# Patient Record
Sex: FEMALE | Race: WHITE | NOT HISPANIC OR LATINO | Employment: OTHER | URBAN - METROPOLITAN AREA
[De-identification: names, ages, dates, MRNs, and addresses within clinical notes are randomized per-mention and may not be internally consistent; named-entity substitution may affect disease eponyms.]

---

## 2017-08-16 DIAGNOSIS — Z11.59 NEED FOR HEPATITIS C SCREENING TEST: Primary | ICD-10-CM

## 2017-08-18 ENCOUNTER — OFFICE VISIT (OUTPATIENT)
Dept: FAMILY MEDICINE | Facility: CLINIC | Age: 67
End: 2017-08-18
Payer: COMMERCIAL

## 2017-08-18 VITALS
SYSTOLIC BLOOD PRESSURE: 110 MMHG | HEIGHT: 66 IN | BODY MASS INDEX: 22.76 KG/M2 | DIASTOLIC BLOOD PRESSURE: 70 MMHG | WEIGHT: 141.63 LBS | HEART RATE: 83 BPM | OXYGEN SATURATION: 95 %

## 2017-08-18 DIAGNOSIS — Z13.220 LIPID SCREENING: ICD-10-CM

## 2017-08-18 DIAGNOSIS — Z00.00 ENCOUNTER FOR PREVENTIVE HEALTH EXAMINATION: Primary | ICD-10-CM

## 2017-08-18 DIAGNOSIS — Z12.39 BREAST CANCER SCREENING: ICD-10-CM

## 2017-08-18 DIAGNOSIS — Z12.2 ENCOUNTER FOR SCREENING FOR LUNG CANCER: ICD-10-CM

## 2017-08-18 DIAGNOSIS — G47.09 SLEEP INITIATION DISORDER: ICD-10-CM

## 2017-08-18 DIAGNOSIS — Z78.0 ASYMPTOMATIC POSTMENOPAUSAL STATE: ICD-10-CM

## 2017-08-18 DIAGNOSIS — Z11.59 NEED FOR HEPATITIS C SCREENING TEST: ICD-10-CM

## 2017-08-18 PROCEDURE — 90471 IMMUNIZATION ADMIN: CPT | Mod: S$GLB,,,

## 2017-08-18 PROCEDURE — 90670 PCV13 VACCINE IM: CPT | Mod: S$GLB,,,

## 2017-08-18 PROCEDURE — 99397 PER PM REEVAL EST PAT 65+ YR: CPT | Mod: 25,S$GLB,,

## 2017-08-18 PROCEDURE — 99999 PR PBB SHADOW E&M-EST. PATIENT-LVL IV: CPT | Mod: PBBFAC,,,

## 2017-08-18 NOTE — PROGRESS NOTES
Subjective:       Patient ID: Belen Salazar is a 67 y.o. female.    Chief Complaint: Annual Exam  Has trouble sleeping at night. Has difficulty falling asleep, usually gets into bed around 10, reads in bed, and cannot fall asleep until 2. If she does fall asleep earlier, she will wake up around 2. Snores at night and was previously told by  that she had periods where she stopped breathing. Sleep study was ordered but was not covered by insurance.  HPI     18 - Recommended (A, B)    Grade Title Risk Info. Details   Had screening 4-5 years ago which was normal Colorectal Cancer: Screening --Adults aged 50 to 75 years     Low risk High Blood Pressure: Screening and Home Monitoring -- Adults     Quit Tobacco Smoking Cessation: Behavioral and Pharmacotherapy Interventions -- Adults who are not pregnant     Drinks 2 liquor drinks per night Alcohol Misuse: Screening and Behavioral Counseling Interventions in Primary Care -- Adults     Low risk BRCA-Related Cancer: Risk Assessment, Genetic Counseling, & Genetic Testing -- Women at Increased Risk     Low risk Breast Cancer: Preventive Medications -- Women At Increased Risk     Last mammogram was 3 years ago, will schedule Breast Cancer: Screening with Mammography-- Women aged 50 to 74 years     Has prescriptions that she hasn't filled in awhile, willing to start new medication. One was expensive Depression: Screening -- General adult population, including pregnant and postpartum women     Will check today Diabetes Mellitus (Type 2) andAbnormal Blood Glucose: Screening -- Adults aged 40 to 70 years who are overweight or obese     Low risk Fall Prevention --Exercise/Physical Therapy ANDVitamin D Supplementation: Community-dwelling Adults 65 Years or Older, Increased Risk for Falls     Eats in moderation, planning on walking and exercising more post-custodial Healthful Diet and Physical Activity for CVD Disease Prevention: Counseling -- Adults with CVD Risk Factors      Low risk Hepatitis B: Screening -- Nonpregnant Adolescents and Adults At High Risk     Will screen today Hepatitis C Virus Infection: Screening--Adults at High Risk and Adults born between 1945 and 1965     Low risk Latent Tuberculosis Infection: Screening -- Asymptomatic adults at increased risk for infection     Candidate Lung Cancer: Screening -- Adults Ages 55-80 who have a 30 pack-year smoking history and currently smoke or have quit within the past 15 years     Low risk Obesity: Screening for and Management of-- All Adults       Has had a DEXA scan which showed osteoporosis Osteoporosis: Screening -- Women 65+ and Younger Women at Increased Risk     Will screen Statin Use for the Primary Prevention of CVD: Preventive Medicine -- Adults age 40 to 75 years with no history of CVD, 1 or more CVD risk factors, and a calculated 10-year CVD event risk of 10% or greater.       The [place] Sleepiness Scale  The [place] Sleepiness Scale is widely used in the field of sleep medicine as a subjective measure of a  patient's sleepiness. The test is a list of eight situations in which you rate your tendency to become  sleepy on a scale of 0, no chance of dozing, to 3, high chance of dozing. When you finish the test, add  up the values of your responses. Your total score is based on a scale of 0 to 24. The scale estimates  whether you are experiencing excessive sleepiness that possibly requires medical attention.  How Sleepy Are You?  How likely are you to doze off or fall asleep in the following situations? You should rate your chances  of dozing off, not just feeling tired. Even if you have not done some of these things recently try to  determine how they would have affected you. For each situation, decide whether or not you would  have:  ·  No chance of dozing =0  ·  Slight chance of dozing =1  ·  Moderate chance of dozing =2  ·  High chance of dozing =3  Write down the number corresponding to your choice in the right  hand column. Total your score below.  Situation Chance of Dozing  Sitting and reading · 1  Watching TV · 2  Sitting inactive in a public place (e.g., a theater or  a meeting) · 1  As a passenger in a car for an hour without a  Break · 1  Lying down to rest in the afternoon when  circumstances permit · 1  Sitting and talking to someone · 0  Sitting quietly after a lunch without alcohol · 0  In a car, while stopped for a few minutes in traffic · 0  Total Score = ____6____________________  Analyze Your Score  Interpretation:  0-7:It is unlikely that you are abnormally sleepy.  8-9:You have an average amount of daytime sleepiness.  10-15:You may be excessively sleepy depending on the situation. You may want to consider  seeking medical attention.  16-24:You are excessively sleepy and should consider seeking medical attention.  Reference: Loly AMEZQUITA. A new method for measuring daytime sleepiness: The [place] Sleepiness Scale. Sleep  1991; 14(6):540-5.  This printed version of the [place] Sleepiness Scale is provided courtesy of [employer], [employer].  www.[employer].  Review of Systems   Constitutional: Positive for fatigue. Negative for appetite change, chills and fever.   HENT: Positive for hearing loss. Negative for sinus pressure, sore throat and trouble swallowing.         Has not noticed drastic hearing loss, but has noticed that she has to turn the TV louder now.    Snores    Eyes: Negative for visual disturbance.   Respiratory: Positive for cough and shortness of breath. Negative for wheezing.         Has some shortness of breath with exertion   Cardiovascular: Negative for chest pain, palpitations and leg swelling.   Gastrointestinal: Negative for blood in stool, constipation, diarrhea, nausea and vomiting.   Genitourinary: Negative for difficulty urinating, hematuria and urgency.   Musculoskeletal: Negative for arthralgias and myalgias.   Allergic/Immunologic: Negative.    Neurological: Negative for  dizziness, light-headedness and headaches.         Cage screening negative     Objective:      Vitals:    08/18/17 0703   BP: 110/70   Pulse: 83     Physical Exam   Constitutional: She is oriented to person, place, and time. She appears well-developed and well-nourished.   HENT:   Mouth/Throat: Oropharynx is clear and moist.   Cardiovascular: Normal rate and regular rhythm.    No murmur heard.  Pulmonary/Chest: Effort normal and breath sounds normal. She has no wheezes.   Abdominal: Soft. Bowel sounds are normal. There is no tenderness.   Musculoskeletal: Normal range of motion.   Lymphadenopathy:     She has no cervical adenopathy.   Neurological: She is alert and oriented to person, place, and time.   Skin: Skin is warm and dry.   Vitals reviewed.      Assessment:       1. Encounter for preventive health examination    2. Breast cancer screening    3. Asymptomatic postmenopausal state    4. Lipid screening    5. Sleep initiation disorder    6. Need for hepatitis C screening test    7. Encounter for screening for lung cancer        Plan:       Encounter for preventive health examination    Breast cancer screening  -     Mammo Digital Screening Bilateral With CAD; Future; Expected date: 08/18/2017    Asymptomatic postmenopausal state  -     DXA Bone Density Spine And Hip; Future; Expected date: 08/18/2017    Lipid screening  -     CBC auto differential; Future; Expected date: 08/18/2017  -     Comprehensive metabolic panel; Future; Expected date: 08/18/2017  -     Lipid panel; Future; Expected date: 08/18/2017    Sleep initiation disorder  -     TSH; Future; Expected date: 08/18/2017    Need for hepatitis C screening test  -     Hepatitis C antibody; Future; Expected date: 08/18/2017    Encounter for screening for lung cancer  -     CT Chest Lung Screening Low Dose; Future; Expected date: 08/18/2017    Other orders  -     (In Office Administered) Pneumococcal Conjugate Vaccine (13 Valent) (IM)      Return in about 1  year (around 8/18/2018).        Sleep hygiene tips:  Maintain a regular sleep routine  Go to bed at the same time. Wake up at the same time. Ideally, your schedule will remain the same (+/- 20 minutes) every night of the week.  Avoid naps if possible  Naps decrease the Sleep Debt that is so necessary for easy sleep onset.   Each of us needs a certain amount of sleep per 24-hour period. We need that amount, and we dont need more than that.   When we take naps, it decreases the amount of sleep that we need the next night - which may cause sleep fragmentation and difficulty initiating sleep, and may lead to insomnia.  Dont stay in bed awake for more than 5-10 minutes.  If you find your mind racing, or worrying about not being able to sleep during the middle of the night, get out of bed, and sit in a chair in the dark. Do your mind racing in the chair until you are sleepy, then return to bed. No TV or internet during these periods! That will just stimulate you more than desired.  If this happens several times during the night, that is OK. Just maintain your regular wake time, and try to avoid naps.  Dont watch TV or read in bed.  When you watch TV or read in bed, you associate the bed with wakefulness.   The bed is reserved for two things - sleep and hanky panky.  Drink caffeinated drinks with caution  The effects of caffeine may last for several hours after ingestion. Caffeine can fragment sleep, and cause difficulty initiating sleep. If you drink caffeine, use it only before noon.   Remember that soda and tea contain caffeine as well.     Avoid inappropriate substances that interfere with sleep  Cigarettes, alcohol, and over-the-counter medications may cause fragmented sleep.  Exercise regularly  Exercise before 2 pm every day. Exercise promotes continuous sleep.   Avoid rigorous exercise before bedtime. Rigorous exercise circulates endorphins into the body which may cause difficulty initiating sleep.   exercise  and sleep  Have a quiet, comfortable bedroom  Set your bedroom thermostat at a comfortable temperature. Generally, a little cooler is better than a little warmer.   Turn off the TV and other extraneous noise that may disrupt sleep. Background white noise like a fan is OK.   If your pets awaken you, keep them outside the bedroom.   Your bedroom should be dark. Turn off bright lights.   Have a comfortable mattress.  If you are a clock watcher at night, hide the clock.    Have a comfortable pre-bedtime routine  A warm bath, shower   Meditation, or quiet time  Some who are struggling with sleep regularly find it helpful to print out these recommendations and read them regularly. If you accidentally miss some of recommendations, or have a bad night, do not fret. By following these sleep hygiene recommendations, you will help yourself to get into a routine that promotes good sleep opportunities.

## 2017-08-21 ENCOUNTER — TELEPHONE (OUTPATIENT)
Dept: FAMILY MEDICINE | Facility: CLINIC | Age: 67
End: 2017-08-21

## 2017-08-21 RX ORDER — ALENDRONATE SODIUM 70 MG/1
70 TABLET ORAL
Qty: 12 TABLET | Refills: 3 | Status: SHIPPED | OUTPATIENT
Start: 2017-08-21 | End: 2018-07-18 | Stop reason: SDUPTHER

## 2017-08-22 DIAGNOSIS — Z12.11 COLON CANCER SCREENING: ICD-10-CM

## 2018-02-01 ENCOUNTER — OFFICE VISIT (OUTPATIENT)
Dept: FAMILY MEDICINE | Facility: CLINIC | Age: 68
End: 2018-02-01
Payer: MEDICARE

## 2018-02-01 VITALS
HEIGHT: 66 IN | OXYGEN SATURATION: 99 % | BODY MASS INDEX: 23.3 KG/M2 | SYSTOLIC BLOOD PRESSURE: 116 MMHG | DIASTOLIC BLOOD PRESSURE: 78 MMHG | HEART RATE: 74 BPM | WEIGHT: 145 LBS

## 2018-02-01 DIAGNOSIS — R51.9 OCCIPITAL HEADACHE: Primary | ICD-10-CM

## 2018-02-01 PROCEDURE — 3008F BODY MASS INDEX DOCD: CPT | Mod: S$GLB,,,

## 2018-02-01 PROCEDURE — 1159F MED LIST DOCD IN RCRD: CPT | Mod: S$GLB,,,

## 2018-02-01 PROCEDURE — 99999 PR PBB SHADOW E&M-EST. PATIENT-LVL III: CPT | Mod: PBBFAC,,,

## 2018-02-01 PROCEDURE — 99214 OFFICE O/P EST MOD 30 MIN: CPT | Mod: S$GLB,,,

## 2018-02-01 PROCEDURE — 1125F AMNT PAIN NOTED PAIN PRSNT: CPT | Mod: S$GLB,,,

## 2018-02-01 NOTE — PROGRESS NOTES
Subjective:       Patient ID: Belen Salazar is a 67 y.o. female.    Chief Complaint: Headache    HPI The patient is having sudden sharp stabbing pain on the left side of her head starting less than a week ago. Starting while she was laying down. She is not sleeping very good. She has early morning arousals. No trauma to her head.     Review of Systems   Constitutional: Negative.  Negative for activity change, appetite change, chills, diaphoresis, fatigue, fever and unexpected weight change.   HENT: Negative.  Negative for congestion, dental problem, sinus pain, trouble swallowing and voice change.    Eyes: Positive for visual disturbance.        Blurry vision coincides with onset of head pain    Respiratory: Negative.  Negative for shortness of breath.    Cardiovascular: Negative.  Negative for chest pain.   Gastrointestinal: Negative.    Endocrine: Negative.    Genitourinary: Negative.    Musculoskeletal: Negative.  Negative for arthralgias, myalgias, neck pain and neck stiffness.   Skin: Negative.    Allergic/Immunologic: Negative.    Neurological: Positive for headaches. Negative for dizziness, speech difficulty, weakness, light-headedness and numbness.   Hematological: Negative.  Negative for adenopathy. Does not bruise/bleed easily.   Psychiatric/Behavioral: Positive for sleep disturbance.   All other systems reviewed and are negative.      Objective:      Vitals:    02/01/18 1124   BP: 116/78   Pulse: 74     Physical Exam   Constitutional: She is oriented to person, place, and time. She appears well-developed and well-nourished. She is active.  Non-toxic appearance. She does not have a sickly appearance. She does not appear ill. No distress.   HENT:   Head: Normocephalic and atraumatic.   Right Ear: External ear normal.   Left Ear: External ear normal.   Nose: Nose normal.   Hearing normal.    Eyes: Conjunctivae are normal. Pupils are equal, round, and reactive to light.   Normal fundoscopic exam    Neck:  Normal range of motion. Neck supple. No thyroid mass and no thyromegaly present.   Cardiovascular: Normal rate, regular rhythm, normal heart sounds and intact distal pulses.  Exam reveals no gallop and no friction rub.    No murmur heard.  Pulses:       Dorsalis pedis pulses are 2+ on the right side, and 2+ on the left side.        Posterior tibial pulses are 2+ on the right side, and 2+ on the left side.   Pulmonary/Chest: Effort normal and breath sounds normal. No respiratory distress. She exhibits no tenderness.   Musculoskeletal: Normal range of motion. She exhibits no edema.   Lymphadenopathy:     She has no cervical adenopathy.   Neurological: She is alert and oriented to person, place, and time. She has normal strength. No cranial nerve deficit. Coordination and gait normal.   Skin: Skin is warm and dry. She is not diaphoretic. No pallor.   Psychiatric: She has a normal mood and affect. Her speech is normal and behavior is normal. Judgment and thought content normal. Cognition and memory are normal.   Vitals reviewed.      Assessment:       1. Occipital headache        Plan:       Occipital headache  -     Ambulatory referral to Pain Clinic  -     CBC auto differential; Future; Expected date: 02/01/2018  -     Sedimentation rate, manual; Future; Expected date: 02/01/2018    Other orders  -     zoster vaccine live, PF, (ZOSTAVAX, PF,) 19,400 unit/0.65 mL injection; Inject 19,400 Units into the skin once.  Dispense: 1 vial; Refill: 0  -     diphth,pertus,acell,,tetanus (BOOSTRIX) 2.5-8-5 Lf-mcg-Lf/0.5mL Susp; Inject 0.5 mLs into the muscle once.  Dispense: 0.5 mL; Refill: 0      Follow-up in about 3 months (around 5/1/2018).

## 2018-07-18 ENCOUNTER — OFFICE VISIT (OUTPATIENT)
Dept: INTERNAL MEDICINE | Facility: CLINIC | Age: 68
End: 2018-07-18
Payer: MEDICARE

## 2018-07-18 VITALS
SYSTOLIC BLOOD PRESSURE: 118 MMHG | RESPIRATION RATE: 16 BRPM | BODY MASS INDEX: 22.77 KG/M2 | DIASTOLIC BLOOD PRESSURE: 60 MMHG | TEMPERATURE: 98 F | OXYGEN SATURATION: 98 % | WEIGHT: 141.69 LBS | HEIGHT: 66 IN | HEART RATE: 79 BPM

## 2018-07-18 DIAGNOSIS — M81.0 OSTEOPOROSIS, UNSPECIFIED OSTEOPOROSIS TYPE, UNSPECIFIED PATHOLOGICAL FRACTURE PRESENCE: Primary | ICD-10-CM

## 2018-07-18 DIAGNOSIS — Z13.6 SCREENING FOR CARDIOVASCULAR CONDITION: ICD-10-CM

## 2018-07-18 PROCEDURE — 99999 PR PBB SHADOW E&M-EST. PATIENT-LVL III: CPT | Mod: PBBFAC,,, | Performed by: INTERNAL MEDICINE

## 2018-07-18 PROCEDURE — 99213 OFFICE O/P EST LOW 20 MIN: CPT | Mod: S$GLB,,, | Performed by: INTERNAL MEDICINE

## 2018-07-18 RX ORDER — CYCLOSPORINE 0.5 MG/ML
1 EMULSION OPHTHALMIC 2 TIMES DAILY
COMMUNITY
Start: 2018-07-12

## 2018-07-18 RX ORDER — ALENDRONATE SODIUM 70 MG/1
70 TABLET ORAL
Qty: 12 TABLET | Refills: 3 | Status: SHIPPED | OUTPATIENT
Start: 2018-07-18 | End: 2018-08-06 | Stop reason: SDUPTHER

## 2018-07-18 NOTE — PROGRESS NOTES
"Subjective:      Patient ID: Belen Salazar is a 68 y.o. female.    Chief Complaint: Establish Care and Medication Refill    HPI: 68 y.o. White female, was a pt of Dr. Granda.  New to me.  I have reviewed her chart,PMH,SH,.  Only complaint: 2 lesions on right cheek.    Doing well.  UTD with procedures.  Exercises 4xweek.  Former smoker, quit decades ago. Social ETOH.  NKA.         Review of Systems   Constitutional: Negative.    HENT: Negative.    Eyes: Positive for visual disturbance.        Dry eyes   Respiratory: Negative.    Cardiovascular: Negative.    Gastrointestinal: Negative.    Endocrine: Negative.    Genitourinary: Negative.    Musculoskeletal: Negative.    Skin: Negative.    Allergic/Immunologic: Negative.    Neurological: Negative.    Hematological: Negative.    Psychiatric/Behavioral: Negative.        Objective:   /60 (BP Location: Left arm, Patient Position: Sitting, BP Method: Medium (Manual))   Pulse 79   Temp 98.3 °F (36.8 °C) (Oral)   Resp 16   Ht 5' 5.5" (1.664 m)   Wt 64.3 kg (141 lb 11.2 oz)   SpO2 98%   BMI 23.22 kg/m²     Physical Exam   Constitutional: She is oriented to person, place, and time. She appears well-developed and well-nourished.   HENT:   Head: Normocephalic and atraumatic.   Left Ear: External ear normal.   Nose: Nose normal.   Mouth/Throat: Oropharynx is clear and moist.   Eyes: Conjunctivae and EOM are normal. Pupils are equal, round, and reactive to light.   Neck: Normal range of motion. Neck supple.   Cardiovascular: Normal rate, regular rhythm and normal heart sounds.    Pulmonary/Chest: Effort normal and breath sounds normal.   Abdominal: Soft. Bowel sounds are normal.   Musculoskeletal: Normal range of motion.   Neurological: She is alert and oriented to person, place, and time.   Skin: Skin is warm and dry.   Skin tag  Sebaceous cyst  Both on right cheek.   Psychiatric: She has a normal mood and affect. Her behavior is normal. Judgment and thought " content normal.   Nursing note and vitals reviewed.      Assessment:     1. Osteoporosis, unspecified osteoporosis type, unspecified pathological fracture presence    2. Screening for cardiovascular condition      Plan:     Osteoporosis, unspecified osteoporosis type, unspecified pathological fracture presence  -     Comprehensive metabolic panel; Future; Expected date: 07/18/2018  -     CBC auto differential; Future; Expected date: 07/18/2018  -     Urinalysis; Future; Expected date: 07/18/2018  -     Vitamin D; Future; Expected date: 07/18/2018  -     alendronate (FOSAMAX) 70 MG tablet; Take 1 tablet (70 mg total) by mouth every 7 days.  Dispense: 12 tablet; Refill: 3    Screening for cardiovascular condition  -     Lipid panel; Future; Expected date: 07/18/2018

## 2018-08-06 ENCOUNTER — PATIENT MESSAGE (OUTPATIENT)
Dept: INTERNAL MEDICINE | Facility: CLINIC | Age: 68
End: 2018-08-06

## 2018-08-06 DIAGNOSIS — M81.0 OSTEOPOROSIS, UNSPECIFIED OSTEOPOROSIS TYPE, UNSPECIFIED PATHOLOGICAL FRACTURE PRESENCE: ICD-10-CM

## 2018-08-06 RX ORDER — ALENDRONATE SODIUM 70 MG/1
70 TABLET ORAL
Qty: 12 TABLET | Refills: 3 | Status: SHIPPED | OUTPATIENT
Start: 2018-08-06 | End: 2019-08-06

## 2019-05-14 LAB
LEFT EYE DM RETINOPATHY: NORMAL
RIGHT EYE DM RETINOPATHY: NORMAL

## 2019-05-27 ENCOUNTER — TELEPHONE (OUTPATIENT)
Dept: ADMINISTRATIVE | Facility: HOSPITAL | Age: 69
End: 2019-05-27

## 2019-07-24 ENCOUNTER — OFFICE VISIT (OUTPATIENT)
Dept: INTERNAL MEDICINE | Facility: CLINIC | Age: 69
End: 2019-07-24
Payer: MEDICARE

## 2019-07-24 VITALS
OXYGEN SATURATION: 97 % | HEART RATE: 67 BPM | DIASTOLIC BLOOD PRESSURE: 74 MMHG | WEIGHT: 144.63 LBS | TEMPERATURE: 98 F | HEIGHT: 66 IN | BODY MASS INDEX: 23.24 KG/M2 | SYSTOLIC BLOOD PRESSURE: 106 MMHG

## 2019-07-24 DIAGNOSIS — Z78.9 PARTICIPANT IN HEALTH AND WELLNESS PLAN: Primary | ICD-10-CM

## 2019-07-24 DIAGNOSIS — Z23 NEED FOR PNEUMOCOCCAL VACCINATION: ICD-10-CM

## 2019-07-24 DIAGNOSIS — R07.89 CHEST HEAVINESS: ICD-10-CM

## 2019-07-24 DIAGNOSIS — L91.8 SKIN TAGS, MULTIPLE ACQUIRED: ICD-10-CM

## 2019-07-24 DIAGNOSIS — Z12.31 ENCOUNTER FOR SCREENING MAMMOGRAM FOR BREAST CANCER: ICD-10-CM

## 2019-07-24 PROCEDURE — 99999 PR PBB SHADOW E&M-EST. PATIENT-LVL IV: ICD-10-PCS | Mod: PBBFAC,,, | Performed by: INTERNAL MEDICINE

## 2019-07-24 PROCEDURE — 99999 PR PBB SHADOW E&M-EST. PATIENT-LVL IV: CPT | Mod: PBBFAC,,, | Performed by: INTERNAL MEDICINE

## 2019-07-24 PROCEDURE — 99397 PR PREVENTIVE VISIT,EST,65 & OVER: ICD-10-PCS | Mod: 25,S$GLB,, | Performed by: INTERNAL MEDICINE

## 2019-07-24 PROCEDURE — 99397 PER PM REEVAL EST PAT 65+ YR: CPT | Mod: 25,S$GLB,, | Performed by: INTERNAL MEDICINE

## 2019-07-24 PROCEDURE — 93005 EKG 12-LEAD: ICD-10-PCS | Mod: S$GLB,,, | Performed by: INTERNAL MEDICINE

## 2019-07-24 PROCEDURE — 93005 ELECTROCARDIOGRAM TRACING: CPT | Mod: S$GLB,,, | Performed by: INTERNAL MEDICINE

## 2019-07-24 PROCEDURE — 93010 EKG 12-LEAD: ICD-10-PCS | Mod: S$GLB,,, | Performed by: INTERNAL MEDICINE

## 2019-07-24 PROCEDURE — G0009 ADMIN PNEUMOCOCCAL VACCINE: HCPCS | Mod: S$GLB,,, | Performed by: INTERNAL MEDICINE

## 2019-07-24 PROCEDURE — 93010 ELECTROCARDIOGRAM REPORT: CPT | Mod: S$GLB,,, | Performed by: INTERNAL MEDICINE

## 2019-07-24 PROCEDURE — G0009 PNEUMOCOCCAL POLYSACCHARIDE VACCINE 23-VALENT =>2YO SQ IM: ICD-10-PCS | Mod: S$GLB,,, | Performed by: INTERNAL MEDICINE

## 2019-07-24 PROCEDURE — 90732 PPSV23 VACC 2 YRS+ SUBQ/IM: CPT | Mod: S$GLB,,, | Performed by: INTERNAL MEDICINE

## 2019-07-24 PROCEDURE — 90732 PNEUMOCOCCAL POLYSACCHARIDE VACCINE 23-VALENT =>2YO SQ IM: ICD-10-PCS | Mod: S$GLB,,, | Performed by: INTERNAL MEDICINE

## 2019-07-24 NOTE — PROGRESS NOTES
INTERNAL MEDICINE    Patient Active Problem List   Diagnosis    Depression    Occipital headache       CC:   Chief Complaint   Patient presents with    Annual Exam       SUBJECTIVE:  Belen Salazar   is a 69 y.o. female  HPI   69y/oWF presents for her annual exam.  She has had some skin tags that get caught, and that are bothering her.  However, more importantly, she has had on occasion a chest heaviness, unassociated with: diaphoresis,SOB,ALLISON,PND,orthopnea,exertion,radiation,reflux symptoms, dizziness,weakness,light headedness or impending syncope.  Former smoker.  Still grieving the loss of her  .    ROS: Review of Systems   Constitutional: Negative.    HENT: Negative.    Eyes: Negative.    Respiratory: Positive for chest tightness. Negative for cough, choking, shortness of breath and wheezing.    Cardiovascular: Negative.  Negative for chest pain, palpitations and leg swelling.   Gastrointestinal: Negative.    Endocrine: Negative.    Genitourinary: Negative.    Musculoskeletal: Negative.    Skin: Negative.    Neurological: Negative.    Hematological: Negative.    Psychiatric/Behavioral: Positive for dysphoric mood.       Past Medical History:   Diagnosis Date    Cataract     bilateral, removed 2012    Depression 3/6/2015    Hypotension, unspecified     Miscarriage     3 in the early 1980s    Osteoporosis     diagnosed 2006       Past Surgical History:   Procedure Laterality Date    DILATION AND CURETTAGE OF UTERUS      1 or 2 following miscarriage(s)    EYE SURGERY      cataract removal 2012    TONSILLECTOMY         Family History   Problem Relation Age of Onset    COPD Father     Cancer Maternal Uncle     Cancer Maternal Grandmother     Heart disease Maternal Grandfather     Diabetes Paternal Grandmother     Hypertension Paternal Grandmother     Heart disease Paternal Grandfather     Stroke Paternal Grandfather        Social History     Tobacco Use    Smoking status: Former Smoker  "    Packs/day: 0.75     Years: 30.00     Pack years: 22.50     Types: Cigarettes    Smokeless tobacco: Never Used    Tobacco comment: Has quit previously, not currently considering quitting. Last quit 2 years ago.   Substance Use Topics    Alcohol use: Yes     Alcohol/week: 8.8 oz     Types: 3 Cans of beer, 14 Standard drinks or equivalent per week    Drug use: No       Social History     Social History Narrative    Lives in [place] with her adopted daughter and grandson. 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 She is an ex- cigarette smoker.   Parkinsons disease in 2017.        ALLERGIES: Review of patient's allergies indicates:  No Known Allergies    MEDS:   Current Outpatient Medications:     alendronate (FOSAMAX) 70 MG tablet, Take 1 tablet (70 mg total) by mouth every 7 days., Disp: 12 tablet, Rfl: 3    RESTASIS 0.05 % ophthalmic emulsion, Place 1 drop into both eyes 2 (two) times daily., Disp: , Rfl:     OBJECTIVE:   Vitals:    19 0842   BP: 106/74   BP Location: Left arm   Patient Position: Sitting   BP Method: Medium (Manual)   Pulse: 67   Temp: 97.7 °F (36.5 °C)   TempSrc: Oral   SpO2: 97%   Weight: 65.6 kg (144 lb 9.6 oz)   Height: 5' 5.5" (1.664 m)     Body mass index is 23.7 kg/m².    Physical Exam   Constitutional: She is oriented to person, place, and time. She appears well-developed and well-nourished.   HENT:   Head: Normocephalic and atraumatic.   Right Ear: External ear normal.   Left Ear: External ear normal.   Nose: Nose normal.   Mouth/Throat: Oropharynx is clear and moist.   Eyes: Pupils are equal, round, and reactive to light. Conjunctivae and EOM are normal.   Neck: Normal range of motion. Neck supple.   Cardiovascular: Normal rate, regular rhythm and normal heart sounds.   Pulmonary/Chest: Effort normal and breath sounds normal.   Abdominal: Soft. Bowel sounds are normal.   Musculoskeletal: Normal range of motion.   Neurological: She " is alert and oriented to person, place, and time.   Skin: Skin is warm and dry.   Psychiatric: She has a normal mood and affect. Her behavior is normal. Judgment and thought content normal.   Nursing note and vitals reviewed.        PERTINENT RESULTS:   CBC:  Recent Labs   Lab Result Units 07/24/19  1038   WBC K/uL 6.37   RBC M/uL 4.70   Hemoglobin g/dL 15.0   Hematocrit % 44.4   Platelets K/uL 300   Mean Corpuscular Volume fL 95   Mean Corpuscular Hemoglobin pg 31.9*   Mean Corpuscular Hemoglobin Conc g/dL 33.8     CMP:  Recent Labs   Lab Result Units 07/24/19  1038   Glucose mg/dL 95   Calcium mg/dL 9.3   Albumin g/dL 4.3   Total Protein g/dL 7.3   Sodium mmol/L 140   Potassium mmol/L 4.2   CO2 mmol/L 28   Chloride mmol/L 104   BUN, Bld mg/dL 13   Alkaline Phosphatase U/L 68   ALT U/L 13   AST U/L 27   Total Bilirubin mg/dL 1.1*     URINALYSIS:  Recent Labs   Lab Result Units 07/24/19  1039   Color, UA  Yellow   Specific Gravity, UA  >=1.030*   pH, UA  6.0   Protein, UA  Negative   Bacteria /hpf Occasional   Nitrite, UA  Negative   Leukocytes, UA  1+*   Urobilinogen, UA EU/dL Negative      LIPIDS:  Recent Labs   Lab Result Units 07/24/19  1038   HDL mg/dL 101*   Cholesterol mg/dL 232*   Triglycerides mg/dL 84   LDL Cholesterol mg/dL 114.2   Hdl/Cholesterol Ratio % 43.5   Non-HDL Cholesterol mg/dL 131   Total Cholesterol/HDL Ratio  2.3             ASSESSMENT:  Problem List Items Addressed This Visit     None      Visit Diagnoses     Participant in health and wellness plan    -  Primary    Relevant Orders    CBC auto differential (Completed)    Comprehensive metabolic panel (Completed)    Urinalysis (Completed)    Lipid panel (Completed)    Encounter for screening mammogram for breast cancer        Relevant Orders    Mammo Digital Screening Bilat w/ Wicho    Chest heaviness        Relevant Orders    EKG 12-lead (Completed)    Need for pneumococcal vaccination        Relevant Orders    Pneumococcal Polysaccharide  Vaccine (23 Valent) (SQ/IM) (Completed)    Skin tags, multiple acquired        Relevant Orders    Ambulatory referral to Dermatology          PLAN:   Orders Placed This Encounter    Mammo Digital Screening Bilat w/ Wicho    Pneumococcal Polysaccharide Vaccine (23 Valent) (SQ/IM)    CBC auto differential    Comprehensive metabolic panel    Urinalysis    Lipid panel    Ambulatory referral to Dermatology    EKG 12-lead     Orders Placed This Encounter   Procedures    Mammo Digital Screening Bilat w/ Wicho     Standing Status:   Future     Standing Expiration Date:   9/22/2020     Order Specific Question:   Reason for Exam:     Answer:   screening for breast ca     Order Specific Question:   May the Radiologist modify the order per protocol to meet the clinical needs of the patient?     Answer:   Yes    Pneumococcal Polysaccharide Vaccine (23 Valent) (SQ/IM)    CBC auto differential     Standing Status:   Future     Number of Occurrences:   1     Standing Expiration Date:   7/23/2020    Comprehensive metabolic panel     Standing Status:   Future     Number of Occurrences:   1     Standing Expiration Date:   7/23/2020    Urinalysis     Standing Status:   Future     Number of Occurrences:   1     Standing Expiration Date:   7/23/2020    Lipid panel     Standing Status:   Future     Number of Occurrences:   1     Standing Expiration Date:   7/23/2020    Ambulatory referral to Dermatology     Referral Priority:   Routine     Referral Type:   Consultation     Referral Reason:   Specialty Services Required     Referred to Provider:   Will Mckeon MD     Requested Specialty:   Dermatology     Number of Visits Requested:   1    EKG 12-lead     Standing Status:   Future     Number of Occurrences:   1     Standing Expiration Date:   7/24/2020     Order Specific Question:   Diagnosis     Answer:   Chest heaviness [255408]       Follow-up withme in 6months, unless labs warrant otherwise..   Dr. Guadalupe  Rafaela  Internal Medicine

## 2019-10-22 DIAGNOSIS — M81.0 OSTEOPOROSIS, UNSPECIFIED OSTEOPOROSIS TYPE, UNSPECIFIED PATHOLOGICAL FRACTURE PRESENCE: ICD-10-CM

## 2019-10-22 RX ORDER — ALENDRONATE SODIUM 70 MG/1
TABLET ORAL
Qty: 12 TABLET | Refills: 3 | Status: SHIPPED | OUTPATIENT
Start: 2019-10-22 | End: 2020-02-03 | Stop reason: SDUPTHER

## 2020-01-17 ENCOUNTER — PATIENT OUTREACH (OUTPATIENT)
Dept: ADMINISTRATIVE | Facility: HOSPITAL | Age: 70
End: 2020-01-17

## 2020-02-03 PROBLEM — R51.9 OCCIPITAL HEADACHE: Status: RESOLVED | Noted: 2018-02-01 | Resolved: 2020-02-03

## 2020-02-03 PROBLEM — M81.0 AGE-RELATED OSTEOPOROSIS WITHOUT CURRENT PATHOLOGICAL FRACTURE: Status: ACTIVE | Noted: 2020-02-03

## 2020-02-03 NOTE — PROGRESS NOTES
"FAMILY MEDICINE    Patient Active Problem List   Diagnosis    Age-related osteoporosis without current pathological fracture    Cough    Thyromegaly       CC:   Chief Complaint   Patient presents with    Establish Care    Cough       HPI: Belen Salzaar is a 69 y.o. female  - with osteoporosis presents to establish care and has concerns for chronic cough. Last PCP Dr. Russo    1. Chronic Cough    Onset: thinks possibly 1 year ago but worsening in last 2 months   - she walks 4 miles daily and yesterday she reports episode of chest heaviness with cough for 5 mins. She continued to walk and symptoms resolved. She walked 4 miles this AM without any issues  Duration: several minutes  Character: dry hacking cough "I live on cough drops"   Aggravating factors: seems to notes worst after eating  Relieving factors: cough lozenge  Timing of day: all day  Associated symptoms: tickle sensation in her throat  Negative symptoms: denies sputum, chest pain, shortness of breath, dyspnea on exertion, leg swelling, weakness, unintentional weight loss, voice changes, dysphagia or difficulty swallowing    Wt Readings from Last 5 Encounters:  02/04/20 : 64.8 kg (142 lb 12.8 oz)  07/24/19 : 65.6 kg (144 lb 9.6 oz)  07/18/18 : 64.3 kg (141 lb 11.2 oz)  02/01/18 : 65.8 kg (145 lb)  08/18/17 : 64.2 kg (141 lb 9.6 oz)    Cough   This is a recurrent problem. The current episode started more than 1 year ago. The problem has been waxing and waning. The problem occurs every few minutes. The cough is non-productive. Pertinent negatives include no nasal congestion or sweats. Nothing aggravates the symptoms. Risk factors for lung disease include animal exposure. She has tried OTC cough suppressant for the symptoms. The treatment provided moderate relief. Her past medical history is significant for bronchitis. There is no history of asthma, bronchiectasis or COPD.       HEALTH MAINTENANCE:   Health Maintenance   Topic Date Due    TETANUS " VACCINE  02[birth year]14/1968    DEXA SCAN  08/21/2020    Mammogram  07/30/2021    Colonoscopy  07/09/2022    Lipid Panel  07/24/2024    Hepatitis C Screening  Completed    Pneumococcal Vaccine (65+ Low/Medium Risk)  Completed       ROS: Review of Systems   Constitutional: Negative.    HENT: Negative.    Eyes: Negative.    Respiratory: Positive for cough.         Otherwise negative   Cardiovascular: Negative.    Gastrointestinal: Negative.    Endocrine: Negative.    Genitourinary: Negative.    Musculoskeletal: Negative.    Skin: Negative.    Allergic/Immunologic: Negative.    Neurological: Negative.    Hematological: Negative.    Psychiatric/Behavioral: Negative.        ALLERGIES:   Review of patient's allergies indicates:  No Known Allergies    MEDS:     Current Outpatient Medications:     RESTASIS 0.05 % ophthalmic emulsion, Place 1 drop into both eyes 2 (two) times daily., Disp: , Rfl:     alendronate (FOSAMAX) 70 MG tablet, Take 1 tablet (70 mg total) by mouth every 7 days., Disp: 12 tablet, Rfl: 1    DIPH,PERTUSS,ACEL,,TET VAC,PF, ADULT (ADACEL) 2 Lf-(2.5-5-3-5 mcg)-5Lf/0.5 mL Syrg, Inject 0.5 mLs into the muscle once. for 1 dose, Disp: 0.5 mL, Rfl: 0    Past Medical History:   Diagnosis Date    Cataract     bilateral, removed 2012    Depression 3/6/2015    Hypotension, unspecified     Miscarriage     3 in the early 1980s    Osteoporosis     diagnosed 2006       Past Surgical History:   Procedure Laterality Date    DILATION AND CURETTAGE OF UTERUS      1 or 2 following miscarriage(s)    EYE SURGERY      cataract removal 2012    TONSILLECTOMY         Family History   Problem Relation Age of Onset    COPD Father     Cancer Maternal Uncle     Cancer Maternal Grandmother     Heart disease Maternal Grandfather     Diabetes Paternal Grandmother     Hypertension Paternal Grandmother     Heart disease Paternal Grandfather     Stroke Paternal Grandfather     Hypertension Mother     Neuropathy Brother   "   No Known Problems Sister     Scoliosis Sister     No Known Problems Brother     No Known Problems Brother     No Known Problems Brother     No Known Problems Brother        Social History     Tobacco Use    Smoking status: Former Smoker     Packs/day: 0.75     Years: 30.00     Pack years: 22.50     Types: Cigarettes    Smokeless tobacco: Never Used    Tobacco comment:    Substance Use Topics    Alcohol use: Yes     Alcohol/week: 26.0 standard drinks     Types: 12 Glasses of wine, 14 Standard drinks or equivalent per week     Frequency: 4 or more times a week     Drinks per session: 1 or 2     Binge frequency: Never    Drug use: No       Social History     Social History Narrative    Lives in [place] with her adopted daughter and grandson. 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 She is an ex- cigarette smoker.   Parkinsons disease in 2017.        OBJECTIVE:   Vitals:    20 1355   BP: (!) 102/50   BP Location: Left arm   Patient Position: Sitting   BP Method: Large (Manual)   Pulse: 85   Resp: 18   Temp: 98.5 °F (36.9 °C)   TempSrc: Oral   SpO2: 98%   Weight: 64.8 kg (142 lb 12.8 oz)   Height: 5' 5" (1.651 m)     Body mass index is 23.76 kg/m².    Physical Exam   Constitutional: No distress.   HENT:   Head: Normocephalic and atraumatic.   Right Ear: Tympanic membrane and ear canal normal.   Left Ear: Tympanic membrane and ear canal normal.   Nose: Rhinorrhea present. No mucosal edema. Right sinus exhibits no maxillary sinus tenderness and no frontal sinus tenderness. Left sinus exhibits no maxillary sinus tenderness and no frontal sinus tenderness.   Mouth/Throat: Uvula is midline, oropharynx is clear and moist and mucous membranes are normal.   Eyes: Pupils are equal, round, and reactive to light. EOM are normal.   Neck: Trachea normal and normal range of motion. Neck supple. No JVD present. No tracheal tenderness present. Carotid bruit is not " present. Thyromegaly present.   Cardiovascular: Normal rate, regular rhythm, normal heart sounds and intact distal pulses. Exam reveals no gallop and no friction rub.   No murmur heard.  Pulmonary/Chest: Effort normal and breath sounds normal. She has no decreased breath sounds. She has no wheezes. She has no rhonchi. She has no rales.   Abdominal: Soft. Bowel sounds are normal. She exhibits no distension. There is no tenderness.   Musculoskeletal: She exhibits no edema.   Neurological: She is alert.   Skin: Skin is warm. Capillary refill takes less than 2 seconds.         Depression Patient Health Questionnaire 2/4/2020 7/24/2019 2/1/2018   Over the last two weeks how often have you been bothered by little interest or pleasure in doing things 0 1 0   Over the last two weeks how often have you been bothered by feeling down, depressed or hopeless 0 1 0   PHQ-2 Total Score 0 2 0       PERTINENT RESULTS:   8/21/2017       Narrative     Clinical indication: Asymptomatic menopausal    Technique: A Bone density measurement was performed to evaluate for the presence of bone mineral loss in the axial and appendicular skeleton.. Dual energy X-ray absorptiometry was used to determine the bone density of the lumbar spine (L1-L4) and both hips     Findings:   The bone mineral density of the lumbar spine measured at the L1-L4 region was 0.882 g/cm2. This corresponds to a Z-score of -0.9 and a T-score of -2.5 which places the patient in the osteoporosiscategory of bone mineral density as classified by the WHO criteria.    The bone mineral density of the left hip measured at the neck was 0.573 g/cm2. This corresponds to a Z-score of -1.8 and a T-score of -3.3 which places the patient in the osteoporosis category of bone mineral density as classified by WHO criteria.    The bone mineral density of the right hip measured at the neck was 0.571g/cm2. This corresponds to a Z-score of -1.8 and a T-score of -3.4 which places the patient  in the osteoporosis category of bone density as classified by the WHO criteria.      Impression       1. Lumbar spine (L1-L4): Osteoporosis  2. Left hip: Osteoporosis  3. Right hip: Osteoporosis.     6/5/2015       Narrative     Comparison: None two    Findings: Routine CT screening chest protocol performed without IV contrast. Trace amount of biapical pleural parenchymal scarring/thickening.  Trace interstitial thickening in the superior segment of the right lower lobe and left lung base.  No lung   nodules identified.  no lung consolidation.  The tracheobronchial tree is clear.  The thoracic aorta is normal caliber.  No pericardial or pleural effusion.  Nonobstructing 3-mm left renal calcification.  No mediastinal or axillary lymphadenopathy.    Dextro curvature to the thoracic spine.  Subcentimeter left hepatic lobe liver hypodensity, too small to characterize, possible cyst.  No marrow replacement process.      Impression         The lungs are clear.    Subcentimeter left hepatic hypodensity, too small to characterize, probable cyst.    Nonobstructing 3-mm left renal stone.           Lab Results   Component Value Date    CHOL 232 (H) 07/24/2019    CHOL 204 (H) 08/07/2018    CHOL 240 (H) 08/21/2017     Lab Results   Component Value Date     (H) 07/24/2019    HDL 91 (H) 08/07/2018    HDL 90 (H) 08/21/2017     Lab Results   Component Value Date    LDLCALC 114.2 07/24/2019    LDLCALC 98.4 08/07/2018    LDLCALC 126.4 08/21/2017     Lab Results   Component Value Date    TRIG 84 07/24/2019    TRIG 73 08/07/2018    TRIG 118 08/21/2017     Lab Results   Component Value Date    CHOLHDL 43.5 07/24/2019    CHOLHDL 44.6 08/07/2018    CHOLHDL 37.5 08/21/2017     BMP  Lab Results   Component Value Date     07/24/2019    K 4.2 07/24/2019     07/24/2019    CO2 28 07/24/2019    BUN 13 07/24/2019    CREATININE 0.61 07/24/2019    CALCIUM 9.3 07/24/2019    ANIONGAP 8 07/24/2019    ESTGFRAFRICA >60.0 07/24/2019     EGFRNONAA >60.0 07/24/2019     Lab Results   Component Value Date    ALT 13 07/24/2019    AST 27 07/24/2019    ALKPHOS 68 07/24/2019    BILITOT 1.1 (H) 07/24/2019       ASSESSMENT/PLAN:  Problem List Items Addressed This Visit        Pulmonary    Cough - Primary    Current Assessment & Plan     - chronic and recurrent  - unknown etiology  - recommend CXR  - concern also for postnasal drip vs silent reflux and refer to ENT for evaluation  - seems worst after meals but no dysphagia         Relevant Orders    Ambulatory referral/consult to ENT    X-Ray Chest PA And Lateral       Endocrine    Thyromegaly    Current Assessment & Plan     - discussed with pt and recommend thyroid US         Relevant Orders    US Soft Tissue Head Neck Thyroid    Ambulatory referral/consult to ENT       Orthopedic    Age-related osteoporosis without current pathological fracture    Overview     - 8/21/17 Dexa: lumbar and bilateral hip osteoporosis  - Alendronate 70 mg weekly  - fall prevention  - weight bearing exercises like walking, squats, stair and jogging if able  - over the counter vitamin D3 0555-6894 units daily  - dietary calcium 1200 mg per day with diet or supplements          Relevant Medications    alendronate (FOSAMAX) 70 MG tablet    Other Relevant Orders    DXA Bone Density Spine And Hip          ORDERS:   Orders Placed This Encounter    DXA Bone Density Spine And Hip    US Soft Tissue Head Neck Thyroid    X-Ray Chest PA And Lateral    Ambulatory referral/consult to ENT    DIPH,PERTUSS,ACEL,,TET VAC,PF, ADULT (ADACEL) 2 Lf-(2.5-5-3-5 mcg)-5Lf/0.5 mL Syrg    alendronate (FOSAMAX) 70 MG tablet     Vaccines recommended: Tdap and pt aware at pharmacy    Follow-up in 6 months or sooner if any concerns. I will notify pt of results.     Dr. Kasandra Palacios D.O.   Family Medicine

## 2020-02-04 ENCOUNTER — OFFICE VISIT (OUTPATIENT)
Dept: FAMILY MEDICINE | Facility: CLINIC | Age: 70
End: 2020-02-04
Payer: MEDICARE

## 2020-02-04 VITALS
TEMPERATURE: 99 F | DIASTOLIC BLOOD PRESSURE: 50 MMHG | OXYGEN SATURATION: 98 % | BODY MASS INDEX: 23.79 KG/M2 | HEART RATE: 85 BPM | SYSTOLIC BLOOD PRESSURE: 102 MMHG | RESPIRATION RATE: 18 BRPM | HEIGHT: 65 IN | WEIGHT: 142.81 LBS

## 2020-02-04 DIAGNOSIS — M81.0 AGE-RELATED OSTEOPOROSIS WITHOUT CURRENT PATHOLOGICAL FRACTURE: ICD-10-CM

## 2020-02-04 DIAGNOSIS — R05.9 COUGH: Primary | ICD-10-CM

## 2020-02-04 DIAGNOSIS — E01.0 THYROMEGALY: ICD-10-CM

## 2020-02-04 PROCEDURE — 1159F PR MEDICATION LIST DOCUMENTED IN MEDICAL RECORD: ICD-10-PCS | Mod: S$GLB,,, | Performed by: FAMILY MEDICINE

## 2020-02-04 PROCEDURE — 99999 PR PBB SHADOW E&M-EST. PATIENT-LVL V: CPT | Mod: PBBFAC,,, | Performed by: FAMILY MEDICINE

## 2020-02-04 PROCEDURE — 1101F PR PT FALLS ASSESS DOC 0-1 FALLS W/OUT INJ PAST YR: ICD-10-PCS | Mod: S$GLB,,, | Performed by: FAMILY MEDICINE

## 2020-02-04 PROCEDURE — 99999 PR PBB SHADOW E&M-EST. PATIENT-LVL V: ICD-10-PCS | Mod: PBBFAC,,, | Performed by: FAMILY MEDICINE

## 2020-02-04 PROCEDURE — 1126F PR PAIN SEVERITY QUANTIFIED, NO PAIN PRESENT: ICD-10-PCS | Mod: S$GLB,,, | Performed by: FAMILY MEDICINE

## 2020-02-04 PROCEDURE — 1101F PT FALLS ASSESS-DOCD LE1/YR: CPT | Mod: S$GLB,,, | Performed by: FAMILY MEDICINE

## 2020-02-04 PROCEDURE — 1126F AMNT PAIN NOTED NONE PRSNT: CPT | Mod: S$GLB,,, | Performed by: FAMILY MEDICINE

## 2020-02-04 PROCEDURE — 99214 PR OFFICE/OUTPT VISIT, EST, LEVL IV, 30-39 MIN: ICD-10-PCS | Mod: S$GLB,,, | Performed by: FAMILY MEDICINE

## 2020-02-04 PROCEDURE — 1159F MED LIST DOCD IN RCRD: CPT | Mod: S$GLB,,, | Performed by: FAMILY MEDICINE

## 2020-02-04 PROCEDURE — 99214 OFFICE O/P EST MOD 30 MIN: CPT | Mod: S$GLB,,, | Performed by: FAMILY MEDICINE

## 2020-02-04 RX ORDER — ALENDRONATE SODIUM 70 MG/1
70 TABLET ORAL
Qty: 12 TABLET | Refills: 1 | Status: SHIPPED | OUTPATIENT
Start: 2020-02-04 | End: 2021-03-01 | Stop reason: SDUPTHER

## 2020-02-04 NOTE — ASSESSMENT & PLAN NOTE
- chronic and recurrent  - unknown etiology  - recommend CXR  - concern also for postnasal drip vs silent reflux and refer to ENT for evaluation  - seems worst after meals but no dysphagia

## 2020-02-05 ENCOUNTER — PATIENT OUTREACH (OUTPATIENT)
Dept: ADMINISTRATIVE | Facility: OTHER | Age: 70
End: 2020-02-05

## 2020-02-06 ENCOUNTER — PATIENT MESSAGE (OUTPATIENT)
Dept: FAMILY MEDICINE | Facility: CLINIC | Age: 70
End: 2020-02-06

## 2020-02-06 ENCOUNTER — OFFICE VISIT (OUTPATIENT)
Dept: OTOLARYNGOLOGY | Facility: CLINIC | Age: 70
End: 2020-02-06
Payer: MEDICARE

## 2020-02-06 VITALS
BODY MASS INDEX: 23.9 KG/M2 | SYSTOLIC BLOOD PRESSURE: 118 MMHG | WEIGHT: 143.44 LBS | HEIGHT: 65 IN | HEART RATE: 79 BPM | DIASTOLIC BLOOD PRESSURE: 71 MMHG

## 2020-02-06 DIAGNOSIS — E04.2 MULTIPLE THYROID NODULES: ICD-10-CM

## 2020-02-06 DIAGNOSIS — K21.9 LARYNGOPHARYNGEAL REFLUX (LPR): Primary | ICD-10-CM

## 2020-02-06 DIAGNOSIS — R05.9 COUGH: ICD-10-CM

## 2020-02-06 PROCEDURE — 1159F MED LIST DOCD IN RCRD: CPT | Mod: S$GLB,,, | Performed by: OTOLARYNGOLOGY

## 2020-02-06 PROCEDURE — 31575 DIAGNOSTIC LARYNGOSCOPY: CPT | Mod: S$GLB,,, | Performed by: OTOLARYNGOLOGY

## 2020-02-06 PROCEDURE — 99999 PR PBB SHADOW E&M-EST. PATIENT-LVL IV: CPT | Mod: PBBFAC,,, | Performed by: OTOLARYNGOLOGY

## 2020-02-06 PROCEDURE — 1159F PR MEDICATION LIST DOCUMENTED IN MEDICAL RECORD: ICD-10-PCS | Mod: S$GLB,,, | Performed by: OTOLARYNGOLOGY

## 2020-02-06 PROCEDURE — 1101F PR PT FALLS ASSESS DOC 0-1 FALLS W/OUT INJ PAST YR: ICD-10-PCS | Mod: S$GLB,,, | Performed by: OTOLARYNGOLOGY

## 2020-02-06 PROCEDURE — 99204 OFFICE O/P NEW MOD 45 MIN: CPT | Mod: 25,S$GLB,, | Performed by: OTOLARYNGOLOGY

## 2020-02-06 PROCEDURE — 31575 PR LARYNGOSCOPY, FLEXIBLE; DIAGNOSTIC: ICD-10-PCS | Mod: S$GLB,,, | Performed by: OTOLARYNGOLOGY

## 2020-02-06 PROCEDURE — 1126F AMNT PAIN NOTED NONE PRSNT: CPT | Mod: S$GLB,,, | Performed by: OTOLARYNGOLOGY

## 2020-02-06 PROCEDURE — 1101F PT FALLS ASSESS-DOCD LE1/YR: CPT | Mod: S$GLB,,, | Performed by: OTOLARYNGOLOGY

## 2020-02-06 PROCEDURE — 99204 PR OFFICE/OUTPT VISIT, NEW, LEVL IV, 45-59 MIN: ICD-10-PCS | Mod: 25,S$GLB,, | Performed by: OTOLARYNGOLOGY

## 2020-02-06 PROCEDURE — 1126F PR PAIN SEVERITY QUANTIFIED, NO PAIN PRESENT: ICD-10-PCS | Mod: S$GLB,,, | Performed by: OTOLARYNGOLOGY

## 2020-02-06 PROCEDURE — 99999 PR PBB SHADOW E&M-EST. PATIENT-LVL IV: ICD-10-PCS | Mod: PBBFAC,,, | Performed by: OTOLARYNGOLOGY

## 2020-02-06 RX ORDER — LEVOCETIRIZINE DIHYDROCHLORIDE 5 MG/1
5 TABLET, FILM COATED ORAL NIGHTLY
Qty: 30 TABLET | Refills: 11 | Status: SHIPPED | OUTPATIENT
Start: 2020-02-06 | End: 2021-03-01 | Stop reason: SDUPTHER

## 2020-02-06 RX ORDER — PANTOPRAZOLE SODIUM 40 MG/1
40 TABLET, DELAYED RELEASE ORAL DAILY
Qty: 30 TABLET | Refills: 3 | Status: SHIPPED | OUTPATIENT
Start: 2020-02-06 | End: 2020-07-15 | Stop reason: SDUPTHER

## 2020-02-06 NOTE — LETTER
February 6, 2020      Kasandra Palacios, DO  [address]   [address]   [facility] [address]           [facility]hin[facility]ology  [address]  [facility] [address]  Phone: [phone]  Fax: [phone]          Patient: Belen Salazar   MR Number: [id number]   YOB: 1950   Date of Visit: 2/6/2020       Dear Dr. Kasandra Palacios:    Thank you for referring Belen Salazar to me for evaluation. Attached you will find relevant portions of my assessment and plan of care.    If you have questions, please do not hesitate to call me. I look forward to following Belen Salazar along with you.    Sincerely,    Apurva Hendrickson MD    Enclosure  CC:  No Recipients    If you would like to receive this communication electronically, please contact [email or web address] or [phone] to request more information on [place] [facility] [employer] access.    For providers and/or their staff who would like to refer a patient to [facility], please contact us through our one-stop-shop provider referral line, [facility], at [phone].    If you feel you have received this communication in error or would no longer like to receive these types of communications, please e-mail [email or web address]

## 2020-02-06 NOTE — PATIENT INSTRUCTIONS
Tips to Control Acid Reflux    To control acid reflux, youll need to make some basic diet and lifestyle changes. The simple steps outlined below may be all youll need to ease discomfort.  Watch what you eat  · Avoid fatty foods and spicy foods.  · Eat fewer acidic foods, such as citrus and tomato-based foods. These can increase symptoms.  · Limit drinking alcohol, caffeine, and fizzy beverages. All increase acid reflux.  · Try limiting chocolate, peppermint, and spearmint. These can worsen acid reflux in some people.  Watch when you eat  · Avoid lying down for 3 hours after eating.  · Do not snack before going to bed.  Raise your head  Raising your head and upper body by 4 to 6 inches helps limit reflux when youre lying down. Put blocks under the head of your bed frame to raise it.  Other changes  · Lose weight, if you need to  · Dont exercise near bedtime  · Avoid tight-fitting clothes  · Limit aspirin and ibuprofen  · Stop smoking   Date Last Reviewed: 7/1/2016  © 6819-2109 [employer], [employer]. [address] [place] [address], [place] [address]. All rights reserved. This information is not intended as a substitute for professional medical care. Always follow your healthcare professional's instructions.

## 2020-02-06 NOTE — PROGRESS NOTES
Chief Complaint   Patient presents with    Cough     chronic, coughing spells come and go, started a year ago   .     HPI:Belen Salazar is a 69 y.o. female who has been referred by Dr. Dr. Kasandra Palacios for a one year history of cough.  She reports that it has been worsening over the last 2 months. She describes the cough as a dry cough.  She states that it seems worse after eating.  She reports occasional hoarseness. Her voice is not progressively worsening over this time. There are not pitch breaks or cracks. There is not vocal fatigue. She admits to dysphagia but denies odynophagia, throat pain, and otalgia. She describes intermittent difficulty with feeling that pills or solid foods are harder to get down.  There is no hemoptysis or hematemesis. She is breathing well.     She admits to throat clearing and cough. She denies heartburn and reflux. She denies nasal congestion or post-nasal drip.         Past Medical History:   Diagnosis Date    Cataract     bilateral, removed 2012    Depression 3/6/2015    Hypotension, unspecified     Miscarriage     3 in the early 1980s    Osteoporosis     diagnosed 2006     Social History     Socioeconomic History    Marital status:      Spouse name: Not on file    Number of children: 1    Years of education: Not on file    Highest education level: Not on file   Occupational History    Not on file   Social Needs    Financial resource strain: Not very hard    Food insecurity:     Worry: Never true     Inability: Never true    Transportation needs:     Medical: No     Non-medical: No   Tobacco Use    Smoking status: Former Smoker     Packs/day: 0.75     Years: 30.00     Pack years: 22.50     Types: Cigarettes    Smokeless tobacco: Never Used    Tobacco comment: 2013   Substance and Sexual Activity    Alcohol use: Yes     Alcohol/week: 26.0 standard drinks     Types: 12 Glasses of wine, 14 Standard drinks or equivalent per week     Frequency: 4 or more  times a week     Drinks per session: 1 or 2     Binge frequency: Never    Drug use: No    Sexual activity: Not Currently     Partners: Male     Birth control/protection: Post-menopausal   Lifestyle    Physical activity:     Days per week: 0 days     Minutes per session: Not on file    Stress: Rather much   Relationships    Social connections:     Talks on phone: More than three times a week     Gets together: More than three times a week     Attends Gnosticism service: Not on file     Active member of club or organization: Yes     Attends meetings of clubs or organizations: More than 4 times per year     Relationship status:    Other Topics Concern    Not on file   Social History Narrative    Lives in [place] with her adopted daughter and grandson. 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 She is an ex- cigarette smoker.   Parkinsons disease in 2017.      Past Surgical History:   Procedure Laterality Date    DILATION AND CURETTAGE OF UTERUS      1 or 2 following miscarriage(s)    EYE SURGERY      cataract removal 2012    TONSILLECTOMY       Family History   Problem Relation Age of Onset    COPD Father     Cancer Maternal Uncle     Cancer Maternal Grandmother     Heart disease Maternal Grandfather     Diabetes Paternal Grandmother     Hypertension Paternal Grandmother     Heart disease Paternal Grandfather     Stroke Paternal Grandfather     Hypertension Mother     Neuropathy Brother     No Known Problems Sister     Scoliosis Sister     No Known Problems Brother     No Known Problems Brother     No Known Problems Brother     No Known Problems Brother            Review of Systems  General: negative for chills, fever or weight loss  Psychological: negative for mood changes or depression  Ophthalmic: negative for blurry vision, photophobia or eye pain  ENT: see HPI  Respiratory: no cough, shortness of breath, or wheezing  Cardiovascular: no  chest pain or dyspnea on exertion  Gastrointestinal: no abdominal pain, change in bowel habits, or black/ bloody stools  Musculoskeletal: negative for gait disturbance or muscular weakness  Neurological: no syncope or seizures; no ataxia  Dermatological: negative for puritis,  rash and jaundice  Hematologic/lymphatic: no easy bruising, no new lumps or bumps      Physical Exam:    Vitals:    02/06/20 0953   BP: 118/71   Pulse: 79       Constitutional: Well appearing / communicating without difficutly.  NAD.  Eyes: EOM I Bilaterally  Head/Face: Normocephalic.  Negative paranasal sinus pressure/tenderness.  Salivary glands WNL.  House Brackmann I Bilaterally.    Right Ear: Auricle normal appearance. External Auditory Canal within normal limits no lesions or masses,TM w/o masses/lesions/perforations. TM mobility noted.   Left Ear: Auricle normal appearance. External Auditory Canal within normal limits no lesions or masses,TM w/o masses/lesions/perforations. TM mobility noted.  Nose: No gross nasal septal deviation. Inferior Turbinates 3+ bilaterally. No septal perforation. No masses/lesions. External nasal skin appears normal without masses/lesions.  Oral Cavity: Gingiva/lips within normal limits.  Dentition/gingiva healthy appearing. Mucus membranes moist. Floor of mouth soft, no masses palpated. Oral Tongue mobile. Hard Palate appears normal.    Oropharynx: Base of tongue appears normal. No masses/lesions noted. Tonsillar fossa/pharyngeal wall without lesions. Posterior oropharynx WNL.  Soft palate without masses. Midline uvula.   Neck/Lymphatic: No LAD I-VI bilaterally.  No thyromegaly.  No masses noted on exam.    Mirror laryngoscopy/nasopharyngoscopy: Active gag reflex.  Unable to perform.    Neuro/Psychiatric: AOx3.  Normal mood and affect.   Cardiovascular: Normal carotid pulses bilaterally, no increasing jugular venous distention noted at cervical region bilaterally.    Respiratory: Normal respiratory effort, no  stridor, no retractions noted.    See separate procedure note for FFL.     Diagnostic studies reviewed:    Chest x ray 2/5/2020: The lungs are clear.  There is no pneumothorax or pleural fluid.  The cardiac silhouette is not enlarged.  There is an S shaped scoliosis of the thoracolumbar spine.  There is degenerative change throughout the spine.      Thyroid ultrasound 2/5/2020: Thyroid nodules which do not meet the size criteria for fine needle aspiration.    Assessment:    ICD-10-CM ICD-9-CM    1. Laryngopharyngeal reflux (LPR) K21.9 478.79    2. Cough R05 786.2    3. Multiple thyroid nodules E04.2 241.1      The primary encounter diagnosis was Laryngopharyngeal reflux (LPR). Diagnoses of Cough and Multiple thyroid nodules were also pertinent to this visit.      Plan:  No orders of the defined types were placed in this encounter.    Thyroid ultrasound reviewed. Patient has subcentimeter nodules that do not meet criteria for FNA. Recommend continued monitoring with u/s.     The patient has the physical findings of mild chronic laryngopharyngeal reflux.     I recommended that the patient start taking Protonix 40mg PO daily* and provided a prescription.     I also recommended that the patient refrain from eating within 3 hours of going to bed at night, to elevate the head of bed very subtly and optimize the impact of gravity on the potential reflux.  I recommended that the patient avoid alcohol, caffeine, tobacco, tomato sauce, spicy foods, fried food, and chocolate.      Follow up in 8 weeks to reassess progress with treatment regimen.     Apurva Hendrickson MD

## 2020-02-10 NOTE — PROCEDURES
Procedures     Due to indication in patient's history, presentation or risk factors,  a fiber optic exam was performed. Risks, benefits and alternatives were discussed, patient had no further questions, or all questions answered and patient stated understanding.    SEPARATE PROCEDURE NOTE:    ANESTHESIA:  Topical xylocaine with brett-synephrine    FINDINGS:  Mild to moderate inaterarytenoid erythema and edema    PROCEDURE:  After verbal consent was obtained, the flexible scope was passed through the patient's nasal cavity without difficulty.  The nasopharynx (adenoid pad) and eustachian tube orifices were first visualized and were found to be normal, without masses or irregularity.  The posterior pharyngeal wall and base of tongue were then examined and no mass or irregular tissue was seen.  The scope was then advanced to the larynx, and the epiglottis, valleculae, and piriform sinuses were normal, without masses or mucosal irregularity.  The false vocal folds and true vocal folds were then examined and were found to have normal mobility (full abduction and adduction) and no masses or mucosal irregularity was seen.  The interartyenoid area had mild to moderate edema and erythema consistent with reflux.

## 2020-02-11 ENCOUNTER — PATIENT MESSAGE (OUTPATIENT)
Dept: FAMILY MEDICINE | Facility: CLINIC | Age: 70
End: 2020-02-11

## 2020-02-11 DIAGNOSIS — M81.0 AGE-RELATED OSTEOPOROSIS WITHOUT CURRENT PATHOLOGICAL FRACTURE: ICD-10-CM

## 2020-07-15 RX ORDER — PANTOPRAZOLE SODIUM 40 MG/1
40 TABLET, DELAYED RELEASE ORAL DAILY
Qty: 30 TABLET | Refills: 3 | Status: SHIPPED | OUTPATIENT
Start: 2020-07-15 | End: 2021-02-28

## 2020-10-15 ENCOUNTER — TELEPHONE (OUTPATIENT)
Dept: FAMILY MEDICINE | Facility: CLINIC | Age: 70
End: 2020-10-15

## 2020-10-17 ENCOUNTER — IMMUNIZATION (OUTPATIENT)
Dept: FAMILY MEDICINE | Facility: CLINIC | Age: 70
End: 2020-10-17
Payer: MEDICARE

## 2020-10-17 PROCEDURE — 90694 VACC AIIV4 NO PRSRV 0.5ML IM: CPT | Mod: S$GLB,,, | Performed by: FAMILY MEDICINE

## 2020-10-17 PROCEDURE — G0008 ADMIN INFLUENZA VIRUS VAC: HCPCS | Mod: S$GLB,,, | Performed by: FAMILY MEDICINE

## 2020-10-17 PROCEDURE — G0008 FLU VACCINE - QUADRIVALENT - ADJUVANTED: ICD-10-PCS | Mod: S$GLB,,, | Performed by: FAMILY MEDICINE

## 2020-10-17 PROCEDURE — 90694 FLU VACCINE - QUADRIVALENT - ADJUVANTED: ICD-10-PCS | Mod: S$GLB,,, | Performed by: FAMILY MEDICINE

## 2020-11-24 ENCOUNTER — PES CALL (OUTPATIENT)
Dept: ADMINISTRATIVE | Facility: CLINIC | Age: 70
End: 2020-11-24

## 2020-11-24 ENCOUNTER — PATIENT OUTREACH (OUTPATIENT)
Dept: ADMINISTRATIVE | Facility: HOSPITAL | Age: 70
End: 2020-11-24

## 2020-11-24 NOTE — PROGRESS NOTES
Scanned 11/23/20 [facility] paperwork in .   Updated immunizations.

## 2020-12-21 ENCOUNTER — PATIENT MESSAGE (OUTPATIENT)
Dept: FAMILY MEDICINE | Facility: CLINIC | Age: 70
End: 2020-12-21

## 2021-01-09 ENCOUNTER — IMMUNIZATION (OUTPATIENT)
Dept: FAMILY MEDICINE | Facility: CLINIC | Age: 71
End: 2021-01-09
Payer: MEDICARE

## 2021-01-09 DIAGNOSIS — Z23 NEED FOR VACCINATION: ICD-10-CM

## 2021-01-09 PROCEDURE — 91300 COVID-19, MRNA, LNP-S, PF, 30 MCG/0.3 ML DOSE VACCINE: CPT | Mod: PBBFAC | Performed by: FAMILY MEDICINE

## 2021-01-30 ENCOUNTER — IMMUNIZATION (OUTPATIENT)
Dept: FAMILY MEDICINE | Facility: CLINIC | Age: 71
End: 2021-01-30
Payer: MEDICARE

## 2021-01-30 DIAGNOSIS — Z23 NEED FOR VACCINATION: Primary | ICD-10-CM

## 2021-01-30 PROCEDURE — 0002A COVID-19, MRNA, LNP-S, PF, 30 MCG/0.3 ML DOSE VACCINE: CPT | Mod: PBBFAC | Performed by: FAMILY MEDICINE

## 2021-01-30 PROCEDURE — 91300 COVID-19, MRNA, LNP-S, PF, 30 MCG/0.3 ML DOSE VACCINE: CPT | Mod: PBBFAC | Performed by: FAMILY MEDICINE

## 2021-02-16 ENCOUNTER — PATIENT MESSAGE (OUTPATIENT)
Dept: FAMILY MEDICINE | Facility: CLINIC | Age: 71
End: 2021-02-16

## 2021-02-17 ENCOUNTER — TELEPHONE (OUTPATIENT)
Dept: FAMILY MEDICINE | Facility: CLINIC | Age: 71
End: 2021-02-17

## 2021-02-17 ENCOUNTER — PATIENT MESSAGE (OUTPATIENT)
Dept: FAMILY MEDICINE | Facility: CLINIC | Age: 71
End: 2021-02-17

## 2021-02-28 PROBLEM — R05.9 COUGH: Status: RESOLVED | Noted: 2020-02-04 | Resolved: 2021-02-28

## 2021-03-01 ENCOUNTER — OFFICE VISIT (OUTPATIENT)
Dept: FAMILY MEDICINE | Facility: CLINIC | Age: 71
End: 2021-03-01
Payer: MEDICARE

## 2021-03-01 VITALS
OXYGEN SATURATION: 98 % | HEART RATE: 78 BPM | WEIGHT: 142.69 LBS | RESPIRATION RATE: 18 BRPM | DIASTOLIC BLOOD PRESSURE: 70 MMHG | BODY MASS INDEX: 23.77 KG/M2 | HEIGHT: 65 IN | SYSTOLIC BLOOD PRESSURE: 118 MMHG | TEMPERATURE: 98 F

## 2021-03-01 DIAGNOSIS — E04.2 MULTIPLE THYROID NODULES: ICD-10-CM

## 2021-03-01 DIAGNOSIS — M81.0 AGE-RELATED OSTEOPOROSIS WITHOUT CURRENT PATHOLOGICAL FRACTURE: ICD-10-CM

## 2021-03-01 DIAGNOSIS — Z13.0 SCREENING, IRON DEFICIENCY ANEMIA: ICD-10-CM

## 2021-03-01 DIAGNOSIS — Z12.31 ENCOUNTER FOR SCREENING MAMMOGRAM FOR BREAST CANCER: ICD-10-CM

## 2021-03-01 DIAGNOSIS — J31.0 CHRONIC RHINITIS: ICD-10-CM

## 2021-03-01 DIAGNOSIS — N39.3 STRESS INCONTINENCE: Primary | ICD-10-CM

## 2021-03-01 DIAGNOSIS — Z13.6 ENCOUNTER FOR LIPID SCREENING FOR CARDIOVASCULAR DISEASE: ICD-10-CM

## 2021-03-01 DIAGNOSIS — K21.9 GASTROESOPHAGEAL REFLUX DISEASE WITHOUT ESOPHAGITIS: ICD-10-CM

## 2021-03-01 DIAGNOSIS — Z13.228 SCREENING FOR METABOLIC DISORDER: ICD-10-CM

## 2021-03-01 DIAGNOSIS — Z13.220 ENCOUNTER FOR LIPID SCREENING FOR CARDIOVASCULAR DISEASE: ICD-10-CM

## 2021-03-01 PROBLEM — N39.41 URGE INCONTINENCE: Status: ACTIVE | Noted: 2021-03-01

## 2021-03-01 LAB
AMORPH CRY UR QL COMP ASSIST: ABNORMAL
BACTERIA #/AREA URNS AUTO: ABNORMAL /HPF
BILIRUB UR QL STRIP: NEGATIVE
CLARITY UR REFRACT.AUTO: ABNORMAL
COLOR UR AUTO: YELLOW
GLUCOSE UR QL STRIP: NEGATIVE
HGB UR QL STRIP: NEGATIVE
KETONES UR QL STRIP: NEGATIVE
LEUKOCYTE ESTERASE UR QL STRIP: NEGATIVE
MICROSCOPIC COMMENT: ABNORMAL
NITRITE UR QL STRIP: NEGATIVE
PH UR STRIP: 7 [PH] (ref 5–8)
PROT UR QL STRIP: NEGATIVE
SP GR UR STRIP: 1.02 (ref 1–1.03)
URN SPEC COLLECT METH UR: ABNORMAL

## 2021-03-01 PROCEDURE — 1126F PR PAIN SEVERITY QUANTIFIED, NO PAIN PRESENT: ICD-10-PCS | Mod: S$GLB,,, | Performed by: FAMILY MEDICINE

## 2021-03-01 PROCEDURE — 1126F AMNT PAIN NOTED NONE PRSNT: CPT | Mod: S$GLB,,, | Performed by: FAMILY MEDICINE

## 2021-03-01 PROCEDURE — 1159F PR MEDICATION LIST DOCUMENTED IN MEDICAL RECORD: ICD-10-PCS | Mod: S$GLB,,, | Performed by: FAMILY MEDICINE

## 2021-03-01 PROCEDURE — 99214 OFFICE O/P EST MOD 30 MIN: CPT | Mod: S$GLB,,, | Performed by: FAMILY MEDICINE

## 2021-03-01 PROCEDURE — 3288F PR FALLS RISK ASSESSMENT DOCUMENTED: ICD-10-PCS | Mod: CPTII,S$GLB,, | Performed by: FAMILY MEDICINE

## 2021-03-01 PROCEDURE — 1101F PR PT FALLS ASSESS DOC 0-1 FALLS W/OUT INJ PAST YR: ICD-10-PCS | Mod: CPTII,S$GLB,, | Performed by: FAMILY MEDICINE

## 2021-03-01 PROCEDURE — 1159F MED LIST DOCD IN RCRD: CPT | Mod: S$GLB,,, | Performed by: FAMILY MEDICINE

## 2021-03-01 PROCEDURE — 99999 PR PBB SHADOW E&M-EST. PATIENT-LVL IV: ICD-10-PCS | Mod: PBBFAC,,, | Performed by: FAMILY MEDICINE

## 2021-03-01 PROCEDURE — 1157F ADVNC CARE PLAN IN RCRD: CPT | Mod: S$GLB,,, | Performed by: FAMILY MEDICINE

## 2021-03-01 PROCEDURE — 81001 URINALYSIS AUTO W/SCOPE: CPT

## 2021-03-01 PROCEDURE — 1101F PT FALLS ASSESS-DOCD LE1/YR: CPT | Mod: CPTII,S$GLB,, | Performed by: FAMILY MEDICINE

## 2021-03-01 PROCEDURE — 99214 PR OFFICE/OUTPT VISIT, EST, LEVL IV, 30-39 MIN: ICD-10-PCS | Mod: S$GLB,,, | Performed by: FAMILY MEDICINE

## 2021-03-01 PROCEDURE — 1157F PR ADVANCE CARE PLAN OR EQUIV PRESENT IN MEDICAL RECORD: ICD-10-PCS | Mod: S$GLB,,, | Performed by: FAMILY MEDICINE

## 2021-03-01 PROCEDURE — 3288F FALL RISK ASSESSMENT DOCD: CPT | Mod: CPTII,S$GLB,, | Performed by: FAMILY MEDICINE

## 2021-03-01 PROCEDURE — 99999 PR PBB SHADOW E&M-EST. PATIENT-LVL IV: CPT | Mod: PBBFAC,,, | Performed by: FAMILY MEDICINE

## 2021-03-01 PROCEDURE — 3008F PR BODY MASS INDEX (BMI) DOCUMENTED: ICD-10-PCS | Mod: CPTII,S$GLB,, | Performed by: FAMILY MEDICINE

## 2021-03-01 PROCEDURE — 3008F BODY MASS INDEX DOCD: CPT | Mod: CPTII,S$GLB,, | Performed by: FAMILY MEDICINE

## 2021-03-01 RX ORDER — PANTOPRAZOLE SODIUM 40 MG/1
40 TABLET, DELAYED RELEASE ORAL DAILY
Qty: 30 TABLET | Refills: 3 | Status: CANCELLED | OUTPATIENT
Start: 2021-03-01 | End: 2022-03-01

## 2021-03-01 RX ORDER — ALENDRONATE SODIUM 70 MG/1
70 TABLET ORAL
Qty: 12 TABLET | Refills: 3 | Status: SHIPPED | OUTPATIENT
Start: 2021-03-01 | End: 2022-03-02

## 2021-03-01 RX ORDER — LEVOCETIRIZINE DIHYDROCHLORIDE 5 MG/1
5 TABLET, FILM COATED ORAL NIGHTLY
Qty: 90 TABLET | Refills: 4 | Status: SHIPPED | OUTPATIENT
Start: 2021-03-01 | End: 2023-01-30

## 2021-03-04 ENCOUNTER — PATIENT MESSAGE (OUTPATIENT)
Dept: FAMILY MEDICINE | Facility: CLINIC | Age: 71
End: 2021-03-04

## 2021-03-23 ENCOUNTER — PATIENT MESSAGE (OUTPATIENT)
Dept: FAMILY MEDICINE | Facility: CLINIC | Age: 71
End: 2021-03-23

## 2021-03-23 DIAGNOSIS — Z12.31 ENCOUNTER FOR SCREENING MAMMOGRAM FOR BREAST CANCER: Primary | ICD-10-CM

## 2021-09-17 ENCOUNTER — PATIENT MESSAGE (OUTPATIENT)
Dept: FAMILY MEDICINE | Facility: CLINIC | Age: 71
End: 2021-09-17

## 2021-09-30 ENCOUNTER — PATIENT MESSAGE (OUTPATIENT)
Dept: FAMILY MEDICINE | Facility: CLINIC | Age: 71
End: 2021-09-30

## 2022-01-23 ENCOUNTER — PATIENT MESSAGE (OUTPATIENT)
Dept: FAMILY MEDICINE | Facility: CLINIC | Age: 72
End: 2022-01-23
Payer: MEDICARE

## 2022-02-28 RX ORDER — ALENDRONATE SODIUM 70 MG/1
70 TABLET ORAL
Qty: 12 TABLET | Refills: 3 | Status: CANCELLED | OUTPATIENT
Start: 2022-02-28 | End: 2023-02-28

## 2022-02-28 NOTE — PROGRESS NOTES
FAMILY MEDICINE  [facility]    CC:   Chief Complaint   Patient presents with    Annual Exam       SUBJECTIVE:  Belen Salazar   is a 72 y.o. female  - with thyroid nodules and osteoporosis presents for annual exa,     ENT: Dr. Downs  Dermatology Dr. Mckeon    Today she reports that she is doing well and denies any complaints.     She is the primary caregiver for her mother Ms. No Moore who has been declining over the last year and is currently hospitalized at [facility] for sepsis and sacral decubitus. She is currently on comfort care only.       Review of Systems   All other systems reviewed and are negative.      Past Medical History:   Diagnosis Date    Cataract     bilateral, removed 2012    Depression 3/6/2015    Hypotension, unspecified     Miscarriage     3 in the early 1980s    Osteoporosis     diagnosed 2006       Past Surgical History:   Procedure Laterality Date    DILATION AND CURETTAGE OF UTERUS      1 or 2 following miscarriage(s)    EYE SURGERY      cataract removal 2012    TONSILLECTOMY         Family History   Problem Relation Age of Onset    COPD Father     Cancer Maternal Uncle     Cancer Maternal Grandmother     Heart disease Maternal Grandfather     Diabetes Paternal Grandmother     Hypertension Paternal Grandmother     Heart disease Paternal Grandfather     Stroke Paternal Grandfather     Hypertension Mother     Neuropathy Brother     No Known Problems Sister     Scoliosis Sister     No Known Problems Brother     No Known Problems Brother     No Known Problems Brother     No Known Problems Brother        Social History     Tobacco Use    Smoking status: Former Smoker     Packs/day: 0.75     Years: 30.00     Pack years: 22.50     Types: Cigarettes    Smokeless tobacco: Never Used    Tobacco comment: 2013   Substance Use Topics    Alcohol use: Yes     Alcohol/week: 26.0 standard drinks     Types: 12 Glasses of wine, 14 Standard drinks or  "equivalent per week    Drug use: No       Social History     Social History Narrative    Lives in [place] with her adopted daughter and grandson. 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 She is an ex- cigarette smoker.   Parkinsons disease in 2017.        ALLERGIES: Review of patient's allergies indicates:  No Known Allergies    MEDS:   Current Outpatient Medications:     RESTASIS 0.05 % ophthalmic emulsion, Place 1 drop into both eyes 2 (two) times daily., Disp: , Rfl:     levocetirizine (XYZAL) 5 MG tablet, Take 1 tablet (5 mg total) by mouth every evening., Disp: 90 tablet, Rfl: 4    OBJECTIVE:   Vitals:    22 0818   BP: 120/64   Pulse: 77   SpO2: 98%   Weight: 61.3 kg (135 lb 3.2 oz)   Height: 5' 5" (1.651 m)     Body mass index is 22.5 kg/m².    Physical Exam  Vitals reviewed.   Constitutional:       General: She is not in acute distress.  HENT:      Head: Normocephalic and atraumatic.      Right Ear: Tympanic membrane normal.      Left Ear: Tympanic membrane and ear canal normal.   Eyes:      Pupils: Pupils are equal, round, and reactive to light.   Neck:      Thyroid: No thyroid mass, thyromegaly or thyroid tenderness.      Vascular: No carotid bruit.   Cardiovascular:      Rate and Rhythm: Normal rate and regular rhythm.      Pulses: Normal pulses.      Heart sounds: Normal heart sounds. No murmur heard.    No friction rub. No gallop.   Pulmonary:      Effort: Pulmonary effort is normal.      Breath sounds: Normal breath sounds. No wheezing or rales.   Abdominal:      General: Abdomen is flat. Bowel sounds are normal. There is no distension.      Palpations: Abdomen is soft.      Tenderness: There is no abdominal tenderness. There is no right CVA tenderness or left CVA tenderness.      Hernia: No hernia is present.   Musculoskeletal:      Cervical back: Normal range of motion and neck supple.      Right lower leg: No edema.      Left lower leg: No edema. "   Lymphadenopathy:      Cervical: No cervical adenopathy.   Skin:     General: Skin is warm.      Capillary Refill: Capillary refill takes less than 2 seconds.   Neurological:      Mental Status: She is alert.           PERTINENT RESULTS:   No visits with results within 1 Week(s) from this visit.   Latest known visit with results is:   Lab Visit on 03/04/2021   Component Date Value Ref Range Status    WBC 03/04/2021 5.25  3.90 - 12.70 K/uL Final    RBC 03/04/2021 4.42  4.00 - 5.40 M/uL Final    Hemoglobin 03/04/2021 14.3  12.0 - 16.0 g/dL Final    Hematocrit 03/04/2021 42.9  37.0 - 48.5 % Final    MCV 03/04/2021 97  82 - 98 fL Final    MCH 03/04/2021 32.4 (A) 27.0 - 31.0 pg Final    MCHC 03/04/2021 33.3  32.0 - 36.0 g/dL Final    RDW 03/04/2021 11.9  11.5 - 14.5 % Final    Platelets 03/04/2021 301  150 - 350 K/uL Final    MPV 03/04/2021 9.6  9.2 - 12.9 fL Final    Immature Granulocytes 03/04/2021 0.2  0.0 - 0.5 % Final    Gran # (ANC) 03/04/2021 1.9  1.8 - 7.7 K/uL Final    Immature Grans (Abs) 03/04/2021 0.01  0.00 - 0.04 K/uL Final    Comment: Mild elevation in immature granulocytes is non specific and   can be seen in a variety of conditions including stress response,   acute inflammation, trauma and pregnancy. Correlation with other   laboratory and clinical findings is essential.      Lymph # 03/04/2021 2.8  1.0 - 4.8 K/uL Final    Mono # 03/04/2021 0.5  0.3 - 1.0 K/uL Final    Eos # 03/04/2021 0.1  0.0 - 0.5 K/uL Final    Baso # 03/04/2021 0.02  0.00 - 0.20 K/uL Final    nRBC 03/04/2021 0  0 /100 WBC Final    Gran % 03/04/2021 35.5 (A) 38.0 - 73.0 % Final    Lymph % 03/04/2021 53.1 (A) 18.0 - 48.0 % Final    Mono % 03/04/2021 9.1  4.0 - 15.0 % Final    Eosinophil % 03/04/2021 1.7  0.0 - 8.0 % Final    Basophil % 03/04/2021 0.4  0.0 - 1.9 % Final    Differential Method 03/04/2021 Automated   Final    Sodium 03/04/2021 143  136 - 145 mmol/L Final    Potassium 03/04/2021 5.3 (A) 3.5 -  5.1 mmol/L Final    Chloride 03/04/2021 105  95 - 110 mmol/L Final    CO2 03/04/2021 31 (A) 23 - 29 mmol/L Final    Glucose 03/04/2021 100  70 - 110 mg/dL Final    BUN 03/04/2021 18 (A) 7 - 17 mg/dL Final    Creatinine 03/04/2021 0.71  0.50 - 1.40 mg/dL Final    Calcium 03/04/2021 9.7  8.7 - 10.5 mg/dL Final    Total Protein 03/04/2021 7.6  6.0 - 8.4 g/dL Final    Albumin 03/04/2021 4.4  3.5 - 5.2 g/dL Final    Total Bilirubin 03/04/2021 1.0  0.1 - 1.0 mg/dL Final    Comment: For infants and newborns, interpretation of results should be based  on gestational age, weight and in agreement with clinical  observations.    Premature Infant recommended reference ranges:  Up to 24 hours.............<8.0 mg/dL  Up to 48 hours............<12.0 mg/dL  3-5 days..................<15.0 mg/dL  6-29 days.................<15.0 mg/dL      Alkaline Phosphatase 03/04/2021 55  38 - 126 U/L Final    AST 03/04/2021 33  15 - 46 U/L Final    ALT 03/04/2021 16  10 - 44 U/L Final    Anion Gap 03/04/2021 7 (A) 8 - 16 mmol/L Final    eGFR if African American 03/04/2021 >60.0  >60 mL/min/1.73 m^2 Final    eGFR if non African American 03/04/2021 >60.0  >60 mL/min/1.73 m^2 Final    Comment: Calculation used to obtain the estimated glomerular filtration  rate (eGFR) is the CKD-EPI equation.       TSH 03/04/2021 2.260  0.400 - 4.000 uIU/mL Final    Comment: Warning:  Heterophilic antibodies in serum or plasma of   certain individuals are known to cause interference with   immunoassays. These antibodies may be present in blood samples   from individuals regularly exposed to animal or who have been   treated with animal products.     Patients taking high doses of supplemental biotin may have  negatively biased results.       Free T4 03/04/2021 0.97  0.71 - 1.51 ng/dL Final    Cholesterol 03/04/2021 239 (A) 120 - 199 mg/dL Final    Comment: The National Cholesterol Education Program (NCEP) has set the  following guidelines (reference  ranges) for Cholesterol:  Optimal.....................<200 mg/dL  Borderline High.............200-239 mg/dL  High........................> or = 240 mg/dL      Triglycerides 03/04/2021 56  30 - 150 mg/dL Final    Comment: The National Cholesterol Education Program (NCEP) has set the  following guidelines (reference values) for triglycerides:  Normal......................<150 mg/dL  Borderline High.............150-199 mg/dL  High........................200-499 mg/dL      HDL 03/04/2021 113 (A) 40 - 75 mg/dL Final    Comment: The National Cholesterol Education Program (NCEP) has set the  following guidelines (reference values) for HDL Cholesterol:  Low...............<40 mg/dL  Optimal...........>60 mg/dL      LDL Cholesterol 03/04/2021 114.8  63.0 - 159.0 mg/dL Final    Comment: The National Cholesterol Education Program (NCEP) has set the  following guidelines (reference values) for LDL Cholesterol:  Optimal.......................<130 mg/dL  Borderline High...............130-159 mg/dL  High..........................160-189 mg/dL  Very High.....................>190 mg/dL      HDL/Cholesterol Ratio 03/04/2021 47.3  20.0 - 50.0 % Final    Total Cholesterol/HDL Ratio 03/04/2021 2.1  2.0 - 5.0 Final    Non-HDL Cholesterol 03/04/2021 126  mg/dL Final    Comment: Risk category and Non-HDL cholesterol goals:  Coronary heart disease (CHD)or equivalent (10-year risk of CHD >20%):  Non-HDL cholesterol goal     <130 mg/dL  Two or more CHD risk factors and 10-year risk of CHD <= 20%:  Non-HDL cholesterol goal     <160 mg/dL  0 to 1 CHD risk factor:  Non-HDL cholesterol goal     <190 mg/dL       2/11/2020 Routine      Narrative & Impression  EXAMINATION:  DEXA BONE DENSITY SPINE HIP     CLINICAL HISTORY:  Age-related osteoporosis without current pathological fracture     TECHNIQUE:  DEXA scanning was performed over both hips and lumbar spine.  Review of the images confirms satisfactory positioning and technique.      COMPARISON:  None     FINDINGS:  The bone mineral density within the lumbar spine measured from L1 through L4 is equal to 0.948 g/cm squared with a T score of -1.9 and a Z score of -0.3.     The left femoral neck bone mineral density is equal to 0.597 g/cm squared with a T score of -3.2 and a Z score of -1.5.     The right femoral neck bone mineral density is equal to 0.608 g/cm squared with a T score of -3.1 and a Z score of -1.4.     The left total hip bone mineral density is equal to 0.665 g/cm squared with a T score of -2.7 and a Z score of -1.3.     The right total hip bone mineral density is equal to 0.640 g/cm squared with a T score of -2.9 and a Z score of -1.5.     There is a 46.4% risk of a major osteoporotic fracture and a 22.1% risk of hip fracture in the next 10 years (FRAX).     Impression:     Osteoporosis.     Consider FDA approved medical therapies in postmenopausal women and men aged 50 years and older, based on the following:     *A hip or vertebral (clinical or morphometric) fracture  *T score less than or equal to -2.5 at the femoral neck or spine after appropriate evaluation to exclude secondary causes.  *Low bone mass -- also known as osteopenia (T score between -1.0 and -2.5 at the femoral neck or spine) and a 10 year probability of hip fracture greater than or equal to 3% or a 10 year probability of major osteoporosis-related fracture greater than or equal to 20% based on the [place]-adapted WHO algorithm.  *Clinicians judgment and/or patient preference may indicate treatment for people with 10 year fracture probabilities is above or below these levels.        Electronically signed by: Lenny Teresa MD  Date:                                            02/11/2020  Time:                                           10:50     Last Resulted: 02/11/20 10:50        2/5/2020 Routine      Narrative & Impression  EXAMINATION:  US SOFT TISSUE HEAD NECK THYROID     CLINICAL HISTORY:  Iodine-deficiency  related diffuse (endemic) goiter     TECHNIQUE:  Ultrasound of the thyroid and cervical lymph nodes was performed.     FINDINGS:  The right and left lobes of the thyroid measure 4.5 x 1.3 x 1.5 cm and 5 x 0.9 x 1.1 cm respectively.  The right lobe has a homogeneous echotexture.  There is a heterogeneous 0.6 x 0.8 x 1.3 cm partially cystic nodule within the isthmus.  There are 3 subcentimeter hypoechoic nodules within the left lobe the largest of which measuring 0.9 cm.  There are no enlarged cervical lymph nodes.     Impression:     Thyroid nodules which do not meet the size criteria for fine needle aspiration.        Electronically signed by: Lenny Loo MD  Date:                                            02/05/2020  Time:                                           15:13     Last Resulted: 02/05/20 15:13        3/3/21:   Mammo Digital Screening Bilat w/ Wicho  Narrative: Result:  Mammo Digital Screening Bilat w/ Wicho    History:  Patient is 71 y.o. and is seen for a screening mammogram.    Films Compared:  Prior images (if available) were compared.    Findings:  This procedure was performed using tomosynthesis. Computer-aided detection   was utilized in the interpretation of this examination.     The breasts are heterogeneously dense, which may obscure small masses.   There is no evidence of suspicious masses, microcalcifications or   architectural distortion.  Impression: No mammographic evidence of malignancy.    BI-RADS Category 1: Negative    Recommendation:  Routine screening mammogram in 1 year is recommended.    Your estimated lifetime risk of breast cancer (to age 85) based on   Tyrer-Cuzick risk assessment model is Tyrer-Cuzick: 4.54 %. According to   the American Cancer Society, patients with a lifetime breast cancer risk   of 20% or higher might benefit from supplemental screening tests.        ASSESSMENT/PLAN:  Problem List Items Addressed This Visit        Endocrine    Multiple thyroid nodules     Overview     - 2/5/2020 Thyroid US: The right and left lobes of the thyroid measure 4.5 x 1.3 x 1.5 cm and 5 x 0.9 x 1.1 cm respectively.  The right lobe has a homogeneous echotexture.  There is a heterogeneous 0.6 x 0.8 x 1.3 cm partially cystic nodule within the isthmus.  There are 3 subcentimeter hypoechoic nodules within the left lobe the largest of which measuring 0.9 cm.  There are no enlarged cervical lymph nodes.   - asymptomatic           Relevant Orders    Comprehensive Metabolic Panel    CBC Without Differential    Lipid Panel    TSH       Orthopedic    Age-related osteoporosis without current pathological fracture - Primary    Overview     - 8/21/17 Dexa: lumbar and bilateral hip osteoporosis  - 2/11/2020 Dexa: osteoporosis with improved T score  - Alendronate 70 mg weekly and she discontinued 2/2022. No side effects and opted not to continue with medical management  - fall prevention  - weight bearing exercises like walking, squats, stair and jogging if able  - over the counter vitamin D3 3650-1051 units daily  - dietary calcium 1200 mg per day with diet or supplements  - she declined repeat dexa this year                  ORDERS:   Orders Placed This Encounter    Comprehensive Metabolic Panel    CBC Without Differential    Lipid Panel    TSH     She opts for mammogram every 2 years.   Vaccines recommended:  Influenza and COVID-19 booster    Follow-up 1 year or sooner if any concerns.  This note is dictated using the M*Modal Fluency Direct word recognition program. There are word recognition mistakes that are occasionally missed on review.  Dr. Kasandra Palacios D.O.   Family Medicine

## 2022-03-02 ENCOUNTER — OFFICE VISIT (OUTPATIENT)
Dept: FAMILY MEDICINE | Facility: CLINIC | Age: 72
End: 2022-03-02
Payer: MEDICARE

## 2022-03-02 VITALS
DIASTOLIC BLOOD PRESSURE: 64 MMHG | WEIGHT: 135.19 LBS | SYSTOLIC BLOOD PRESSURE: 120 MMHG | BODY MASS INDEX: 22.52 KG/M2 | OXYGEN SATURATION: 98 % | HEIGHT: 65 IN | HEART RATE: 77 BPM

## 2022-03-02 DIAGNOSIS — M81.0 AGE-RELATED OSTEOPOROSIS WITHOUT CURRENT PATHOLOGICAL FRACTURE: Primary | ICD-10-CM

## 2022-03-02 DIAGNOSIS — E04.2 MULTIPLE THYROID NODULES: ICD-10-CM

## 2022-03-02 PROCEDURE — 3008F PR BODY MASS INDEX (BMI) DOCUMENTED: ICD-10-PCS | Mod: CPTII,S$GLB,, | Performed by: FAMILY MEDICINE

## 2022-03-02 PROCEDURE — 1126F PR PAIN SEVERITY QUANTIFIED, NO PAIN PRESENT: ICD-10-PCS | Mod: CPTII,S$GLB,, | Performed by: FAMILY MEDICINE

## 2022-03-02 PROCEDURE — 3078F PR MOST RECENT DIASTOLIC BLOOD PRESSURE < 80 MM HG: ICD-10-PCS | Mod: CPTII,S$GLB,, | Performed by: FAMILY MEDICINE

## 2022-03-02 PROCEDURE — 1101F PR PT FALLS ASSESS DOC 0-1 FALLS W/OUT INJ PAST YR: ICD-10-PCS | Mod: CPTII,S$GLB,, | Performed by: FAMILY MEDICINE

## 2022-03-02 PROCEDURE — 1157F PR ADVANCE CARE PLAN OR EQUIV PRESENT IN MEDICAL RECORD: ICD-10-PCS | Mod: CPTII,S$GLB,, | Performed by: FAMILY MEDICINE

## 2022-03-02 PROCEDURE — 99214 PR OFFICE/OUTPT VISIT, EST, LEVL IV, 30-39 MIN: ICD-10-PCS | Mod: S$GLB,,, | Performed by: FAMILY MEDICINE

## 2022-03-02 PROCEDURE — 99214 OFFICE O/P EST MOD 30 MIN: CPT | Mod: S$GLB,,, | Performed by: FAMILY MEDICINE

## 2022-03-02 PROCEDURE — 3288F FALL RISK ASSESSMENT DOCD: CPT | Mod: CPTII,S$GLB,, | Performed by: FAMILY MEDICINE

## 2022-03-02 PROCEDURE — 3074F SYST BP LT 130 MM HG: CPT | Mod: CPTII,S$GLB,, | Performed by: FAMILY MEDICINE

## 2022-03-02 PROCEDURE — 3288F PR FALLS RISK ASSESSMENT DOCUMENTED: ICD-10-PCS | Mod: CPTII,S$GLB,, | Performed by: FAMILY MEDICINE

## 2022-03-02 PROCEDURE — 1101F PT FALLS ASSESS-DOCD LE1/YR: CPT | Mod: CPTII,S$GLB,, | Performed by: FAMILY MEDICINE

## 2022-03-02 PROCEDURE — 99999 PR PBB SHADOW E&M-EST. PATIENT-LVL III: CPT | Mod: PBBFAC,,, | Performed by: FAMILY MEDICINE

## 2022-03-02 PROCEDURE — 3074F PR MOST RECENT SYSTOLIC BLOOD PRESSURE < 130 MM HG: ICD-10-PCS | Mod: CPTII,S$GLB,, | Performed by: FAMILY MEDICINE

## 2022-03-02 PROCEDURE — 1126F AMNT PAIN NOTED NONE PRSNT: CPT | Mod: CPTII,S$GLB,, | Performed by: FAMILY MEDICINE

## 2022-03-02 PROCEDURE — 1159F MED LIST DOCD IN RCRD: CPT | Mod: CPTII,S$GLB,, | Performed by: FAMILY MEDICINE

## 2022-03-02 PROCEDURE — 3078F DIAST BP <80 MM HG: CPT | Mod: CPTII,S$GLB,, | Performed by: FAMILY MEDICINE

## 2022-03-02 PROCEDURE — 1157F ADVNC CARE PLAN IN RCRD: CPT | Mod: CPTII,S$GLB,, | Performed by: FAMILY MEDICINE

## 2022-03-02 PROCEDURE — 99999 PR PBB SHADOW E&M-EST. PATIENT-LVL III: ICD-10-PCS | Mod: PBBFAC,,, | Performed by: FAMILY MEDICINE

## 2022-03-02 PROCEDURE — 3008F BODY MASS INDEX DOCD: CPT | Mod: CPTII,S$GLB,, | Performed by: FAMILY MEDICINE

## 2022-03-02 PROCEDURE — 1159F PR MEDICATION LIST DOCUMENTED IN MEDICAL RECORD: ICD-10-PCS | Mod: CPTII,S$GLB,, | Performed by: FAMILY MEDICINE

## 2022-03-03 ENCOUNTER — PATIENT MESSAGE (OUTPATIENT)
Dept: FAMILY MEDICINE | Facility: CLINIC | Age: 72
End: 2022-03-03
Payer: MEDICARE

## 2022-03-11 ENCOUNTER — PATIENT MESSAGE (OUTPATIENT)
Dept: FAMILY MEDICINE | Facility: CLINIC | Age: 72
End: 2022-03-11
Payer: MEDICARE

## 2022-03-11 ENCOUNTER — TELEPHONE (OUTPATIENT)
Dept: FAMILY MEDICINE | Facility: CLINIC | Age: 72
End: 2022-03-11
Payer: MEDICARE

## 2022-03-14 ENCOUNTER — TELEPHONE (OUTPATIENT)
Dept: FAMILY MEDICINE | Facility: CLINIC | Age: 72
End: 2022-03-14
Payer: MEDICARE

## 2022-03-14 NOTE — TELEPHONE ENCOUNTER
----- Message from Martha Ward MA sent at 3/11/2022 10:59 AM CST -----  Dr. Palacios,     This is the document I mentioned that needs to be completed for [employer].  Please let me know if there is an issue and I'll drop a hard copy off at your house.     Wanted to let you know that Mom passed away last Friday, March 4.  Attachments    [id number]

## 2022-03-14 NOTE — TELEPHONE ENCOUNTER
Paperwork completed.  Placed in folder.  Please let patient know completed.  Dr. Kasandra Palacios D.O.   [facility]

## 2022-03-16 ENCOUNTER — PATIENT MESSAGE (OUTPATIENT)
Dept: FAMILY MEDICINE | Facility: CLINIC | Age: 72
End: 2022-03-16
Payer: MEDICARE

## 2022-07-21 ENCOUNTER — PATIENT MESSAGE (OUTPATIENT)
Dept: FAMILY MEDICINE | Facility: CLINIC | Age: 72
End: 2022-07-21
Payer: MEDICARE

## 2022-07-26 ENCOUNTER — IMMUNIZATION (OUTPATIENT)
Dept: FAMILY MEDICINE | Facility: CLINIC | Age: 72
End: 2022-07-26
Payer: MEDICARE

## 2022-07-26 DIAGNOSIS — Z23 NEED FOR VACCINATION: Primary | ICD-10-CM

## 2022-07-26 PROCEDURE — 91305 COVID-19, MRNA, LNP-S, PF, 30 MCG/0.3 ML DOSE VACCINE (PFIZER): CPT | Mod: PBBFAC | Performed by: FAMILY MEDICINE

## 2022-08-12 ENCOUNTER — PATIENT MESSAGE (OUTPATIENT)
Dept: FAMILY MEDICINE | Facility: CLINIC | Age: 72
End: 2022-08-12
Payer: MEDICARE

## 2023-01-30 ENCOUNTER — OFFICE VISIT (OUTPATIENT)
Dept: PRIMARY CARE CLINIC | Facility: CLINIC | Age: 73
End: 2023-01-30
Attending: FAMILY MEDICINE
Payer: MEDICARE

## 2023-01-30 VITALS
BODY MASS INDEX: 22.24 KG/M2 | WEIGHT: 133.5 LBS | OXYGEN SATURATION: 97 % | DIASTOLIC BLOOD PRESSURE: 76 MMHG | SYSTOLIC BLOOD PRESSURE: 128 MMHG | HEART RATE: 107 BPM | HEIGHT: 65 IN

## 2023-01-30 DIAGNOSIS — M54.9 UPPER BACK PAIN: Primary | ICD-10-CM

## 2023-01-30 DIAGNOSIS — M79.18 RHOMBOID MUSCLE PAIN: ICD-10-CM

## 2023-01-30 DIAGNOSIS — S46.812A STRAIN OF LEFT TRAPEZIUS MUSCLE, INITIAL ENCOUNTER: ICD-10-CM

## 2023-01-30 PROCEDURE — 1157F ADVNC CARE PLAN IN RCRD: CPT | Mod: CPTII,S$GLB,, | Performed by: FAMILY MEDICINE

## 2023-01-30 PROCEDURE — 1157F PR ADVANCE CARE PLAN OR EQUIV PRESENT IN MEDICAL RECORD: ICD-10-PCS | Mod: CPTII,S$GLB,, | Performed by: FAMILY MEDICINE

## 2023-01-30 PROCEDURE — 1125F PR PAIN SEVERITY QUANTIFIED, PAIN PRESENT: ICD-10-PCS | Mod: CPTII,S$GLB,, | Performed by: FAMILY MEDICINE

## 2023-01-30 PROCEDURE — 99214 PR OFFICE/OUTPT VISIT, EST, LEVL IV, 30-39 MIN: ICD-10-PCS | Mod: S$GLB,,, | Performed by: FAMILY MEDICINE

## 2023-01-30 PROCEDURE — 1159F PR MEDICATION LIST DOCUMENTED IN MEDICAL RECORD: ICD-10-PCS | Mod: CPTII,S$GLB,, | Performed by: FAMILY MEDICINE

## 2023-01-30 PROCEDURE — 1101F PR PT FALLS ASSESS DOC 0-1 FALLS W/OUT INJ PAST YR: ICD-10-PCS | Mod: CPTII,S$GLB,, | Performed by: FAMILY MEDICINE

## 2023-01-30 PROCEDURE — 99999 PR PBB SHADOW E&M-EST. PATIENT-LVL III: CPT | Mod: PBBFAC,,, | Performed by: FAMILY MEDICINE

## 2023-01-30 PROCEDURE — 3288F FALL RISK ASSESSMENT DOCD: CPT | Mod: CPTII,S$GLB,, | Performed by: FAMILY MEDICINE

## 2023-01-30 PROCEDURE — 1125F AMNT PAIN NOTED PAIN PRSNT: CPT | Mod: CPTII,S$GLB,, | Performed by: FAMILY MEDICINE

## 2023-01-30 PROCEDURE — 3288F PR FALLS RISK ASSESSMENT DOCUMENTED: ICD-10-PCS | Mod: CPTII,S$GLB,, | Performed by: FAMILY MEDICINE

## 2023-01-30 PROCEDURE — 3008F PR BODY MASS INDEX (BMI) DOCUMENTED: ICD-10-PCS | Mod: CPTII,S$GLB,, | Performed by: FAMILY MEDICINE

## 2023-01-30 PROCEDURE — 3078F DIAST BP <80 MM HG: CPT | Mod: CPTII,S$GLB,, | Performed by: FAMILY MEDICINE

## 2023-01-30 PROCEDURE — 1101F PT FALLS ASSESS-DOCD LE1/YR: CPT | Mod: CPTII,S$GLB,, | Performed by: FAMILY MEDICINE

## 2023-01-30 PROCEDURE — 3074F SYST BP LT 130 MM HG: CPT | Mod: CPTII,S$GLB,, | Performed by: FAMILY MEDICINE

## 2023-01-30 PROCEDURE — 3074F PR MOST RECENT SYSTOLIC BLOOD PRESSURE < 130 MM HG: ICD-10-PCS | Mod: CPTII,S$GLB,, | Performed by: FAMILY MEDICINE

## 2023-01-30 PROCEDURE — 99999 PR PBB SHADOW E&M-EST. PATIENT-LVL III: ICD-10-PCS | Mod: PBBFAC,,, | Performed by: FAMILY MEDICINE

## 2023-01-30 PROCEDURE — 3078F PR MOST RECENT DIASTOLIC BLOOD PRESSURE < 80 MM HG: ICD-10-PCS | Mod: CPTII,S$GLB,, | Performed by: FAMILY MEDICINE

## 2023-01-30 PROCEDURE — 1159F MED LIST DOCD IN RCRD: CPT | Mod: CPTII,S$GLB,, | Performed by: FAMILY MEDICINE

## 2023-01-30 PROCEDURE — 3008F BODY MASS INDEX DOCD: CPT | Mod: CPTII,S$GLB,, | Performed by: FAMILY MEDICINE

## 2023-01-30 PROCEDURE — 99214 OFFICE O/P EST MOD 30 MIN: CPT | Mod: S$GLB,,, | Performed by: FAMILY MEDICINE

## 2023-01-30 NOTE — PROGRESS NOTES
"FAMILY MEDICINE  [facility]    Reason for visit:   Chief Complaint   Patient presents with    Shoulder Pain    Back Pain       HPI: Belen Salazar is a 72 y.o. female  - with thyroid nodules and osteoporosis presents for upper back/shoulder pain    ENT: Dr. Downs  Dermatology Dr. Mckeon    Belen Salazar reports that pain started about 4 days ago. She reports that initially it was severe. She could lie on her left side at night but when she changed positions she would get a severe pain between her scapula on the left. She reports that it was difficult to get comfortable. She notes that she has been working on house repairs since [place] and she fell about 1-2 weeks ago striking her left side of her face. She does not recall striking her shoulder or back but she did have her left arm extended. However pain only started recently. She reports pain is sharp to sore 8/10 at worst and currently 0/10. She reports today she went to the gym and it was feeling much better. She has not tried any OTC medications. She denies any weakness, numbness, tingling or swelling.           Review of Systems   All other systems reviewed and are negative.    Social History     Social History Narrative    Lives in [place] with her adopted daughter and grandson. 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 She is an ex- cigarette smoker.   Parkinsons disease in 2017.          ALLERGIES:   Review of patient's allergies indicates:  No Known Allergies    MEDS:     Current Outpatient Medications:     RESTASIS 0.05 % ophthalmic emulsion, Place 1 drop into both eyes 2 (two) times daily., Disp: , Rfl:     Vital signs:   Vitals:    23 1508   BP: 128/76   BP Location: Left arm   Patient Position: Sitting   BP Method: Medium (Manual)   Pulse: 107   SpO2: 97%   Weight: 60.6 kg (133 lb 7.8 oz)   Height: 5' 5" (1.651 m)     Body mass index is 22.21 " kg/m².    PHYSICAL EXAM:     Physical Exam  Constitutional:       General: She is not in acute distress.  Pulmonary:      Effort: Pulmonary effort is normal. No respiratory distress.   Musculoskeletal:      Right shoulder: Normal.      Left shoulder: Normal. No swelling, bony tenderness or crepitus. Normal range of motion.      Cervical back: Tenderness (left trap) present. No swelling, deformity, erythema, rigidity, bony tenderness or crepitus. No pain with movement. Normal range of motion.      Thoracic back: Tenderness (overling left rhomboid and left trapezius) present. No swelling, deformity or bony tenderness. Normal range of motion.   Neurological:      Mental Status: She is alert.   Psychiatric:         Speech: Speech normal.         PERTINENT RESULTS:   No visits with results within 1 Week(s) from this visit.   Latest known visit with results is:   Lab Visit on 03/02/2022   Component Date Value Ref Range Status    Sodium 03/02/2022 143  136 - 145 mmol/L Final    Potassium 03/02/2022 4.3  3.5 - 5.1 mmol/L Final    Chloride 03/02/2022 102  95 - 110 mmol/L Final    CO2 03/02/2022 29  23 - 29 mmol/L Final    Glucose 03/02/2022 106  70 - 110 mg/dL Final    BUN 03/02/2022 17  7 - 17 mg/dL Final    Creatinine 03/02/2022 0.62  0.50 - 1.40 mg/dL Final    Calcium 03/02/2022 9.3  8.7 - 10.5 mg/dL Final    Total Protein 03/02/2022 7.6  6.0 - 8.4 g/dL Final    Albumin 03/02/2022 4.6  3.5 - 5.2 g/dL Final    Total Bilirubin 03/02/2022 1.4 (H)  0.1 - 1.0 mg/dL Final    Comment: For infants and newborns, interpretation of results should be based  on gestational age, weight and in agreement with clinical  observations.    Premature Infant recommended reference ranges:  Up to 24 hours.............<8.0 mg/dL  Up to 48 hours............<12.0 mg/dL  3-5 days..................<15.0 mg/dL  6-29 days.................<15.0 mg/dL      Alkaline Phosphatase 03/02/2022 88  38 - 126 U/L Final    AST 03/02/2022 33  15 - 46 U/L Final     ALT 03/02/2022 16  10 - 44 U/L Final    Anion Gap 03/02/2022 12  8 - 16 mmol/L Final    eGFR if African American 03/02/2022 >60.0  >60 mL/min/1.73 m^2 Final    eGFR if non African American 03/02/2022 >60.0  >60 mL/min/1.73 m^2 Final    Comment: Calculation used to obtain the estimated glomerular filtration  rate (eGFR) is the CKD-EPI equation.       WBC 03/02/2022 5.61  3.90 - 12.70 K/uL Final    RBC 03/02/2022 4.35  4.00 - 5.40 M/uL Final    Hemoglobin 03/02/2022 14.0  12.0 - 16.0 g/dL Final    Hematocrit 03/02/2022 41.3  37.0 - 48.5 % Final    MCV 03/02/2022 95  82 - 98 fL Final    MCH 03/02/2022 32.2 (H)  27.0 - 31.0 pg Final    MCHC 03/02/2022 33.9  32.0 - 36.0 g/dL Final    RDW 03/02/2022 11.9  11.5 - 14.5 % Final    Platelets 03/02/2022 300  150 - 450 K/uL Final    MPV 03/02/2022 9.7  9.2 - 12.9 fL Final    Cholesterol 03/02/2022 237 (H)  120 - 199 mg/dL Final    Comment: The National Cholesterol Education Program (NCEP) has set the  following guidelines (reference ranges) for Cholesterol:  Optimal.....................<200 mg/dL  Borderline High.............200-239 mg/dL  High........................> or = 240 mg/dL      Triglycerides 03/02/2022 71  30 - 150 mg/dL Final    Comment: The National Cholesterol Education Program (NCEP) has set the  following guidelines (reference values) for triglycerides:  Normal......................<150 mg/dL  Borderline High.............150-199 mg/dL  High........................200-499 mg/dL      HDL 03/02/2022 123 (H)  40 - 75 mg/dL Final    Comment: The National Cholesterol Education Program (NCEP) has set the  following guidelines (reference values) for HDL Cholesterol:  Low...............<40 mg/dL  Optimal...........>60 mg/dL      LDL Cholesterol 03/02/2022 99.8  63.0 - 159.0 mg/dL Final    Comment: The National Cholesterol Education Program (NCEP) has set the  following guidelines (reference values) for LDL Cholesterol:  Optimal.......................<130  mg/dL  Borderline High...............130-159 mg/dL  High..........................160-189 mg/dL  Very High.....................>190 mg/dL      HDL/Cholesterol Ratio 03/02/2022 51.9 (H)  20.0 - 50.0 % Final    Total Cholesterol/HDL Ratio 03/02/2022 1.9 (L)  2.0 - 5.0 Final    Non-HDL Cholesterol 03/02/2022 114  mg/dL Final    Comment: Risk category and Non-HDL cholesterol goals:  Coronary heart disease (CHD)or equivalent (10-year risk of CHD >20%):  Non-HDL cholesterol goal     <130 mg/dL  Two or more CHD risk factors and 10-year risk of CHD <= 20%:  Non-HDL cholesterol goal     <160 mg/dL  0 to 1 CHD risk factor:  Non-HDL cholesterol goal     <190 mg/dL      TSH 03/02/2022 1.950  0.400 - 4.000 uIU/mL Final    Comment: Warning:  Heterophilic antibodies in serum or plasma of   certain individuals are known to cause interference with   immunoassays. These antibodies may be present in blood samples   from individuals regularly exposed to animal or who have been   treated with animal products.     Patients taking high doses of supplemental biotin may have  negatively biased results.          ASSESSMENT/PLAN:    1. Upper back pain  Overview:  - discussed that I suspect muscular strain that appears to be improve  - instructed on scapular exercises and trapezius stretches  - okay ice and NSAIDs  - if no improvement in 4-6 weeks or any worsening, instructed to notify me and will have imaging and start PT  - questions elicited and answered  - encouraged to patient to notify me of any questions or concerns        2. Rhomboid muscle pain    3. Strain of left trapezius muscle, initial encounter        Recommend breast cancer screening, colon cancer screening, bone density and she declined    Vaccines recommended: covid-19    Follow-up if no improvement in 1 month    This note is dictated using the M*Modal Fluency Direct word recognition program. There are word recognition mistakes that are occasionally missed on review.      Kasandra Palacios D.O.   [facility]

## 2023-02-09 ENCOUNTER — PATIENT MESSAGE (OUTPATIENT)
Dept: PRIMARY CARE CLINIC | Facility: CLINIC | Age: 73
End: 2023-02-09
Payer: MEDICARE

## 2023-02-09 DIAGNOSIS — R20.0 ARM NUMBNESS: Primary | ICD-10-CM

## 2023-02-09 DIAGNOSIS — M79.672 PAIN IN BOTH FEET: Primary | ICD-10-CM

## 2023-02-09 DIAGNOSIS — M79.671 PAIN IN BOTH FEET: Primary | ICD-10-CM

## 2023-02-09 DIAGNOSIS — M54.6 ACUTE LEFT-SIDED THORACIC BACK PAIN: ICD-10-CM

## 2023-02-09 NOTE — TELEPHONE ENCOUNTER
Please schedule Xrays in [place] and 1 month  follow-up to reevaluate upper back pain    Orders Placed This Encounter    X-Ray Cervical Spine Complete 5 view    X-Ray Thoracic Spine 4 or more views    Ambulatory referral/consult to Physical/Occupational Therapy     Dr. Kasandra Palacios D.O.   Family Medicine

## 2023-02-10 ENCOUNTER — PATIENT MESSAGE (OUTPATIENT)
Dept: PRIMARY CARE CLINIC | Facility: CLINIC | Age: 73
End: 2023-02-10
Payer: MEDICARE

## 2023-02-13 ENCOUNTER — PATIENT MESSAGE (OUTPATIENT)
Dept: PRIMARY CARE CLINIC | Facility: CLINIC | Age: 73
End: 2023-02-13
Payer: MEDICARE

## 2023-02-13 DIAGNOSIS — M47.22 OSTEOARTHRITIS OF SPINE WITH RADICULOPATHY, CERVICAL REGION: ICD-10-CM

## 2023-02-13 DIAGNOSIS — M41.34 THORACOGENIC SCOLIOSIS OF THORACIC REGION: ICD-10-CM

## 2023-02-16 PROBLEM — M54.6 ACUTE LEFT-SIDED THORACIC BACK PAIN: Status: ACTIVE | Noted: 2023-02-16

## 2023-02-16 PROBLEM — M89.8X1 PAIN OF LEFT SCAPULA: Status: ACTIVE | Noted: 2023-02-16

## 2023-02-28 ENCOUNTER — PATIENT OUTREACH (OUTPATIENT)
Dept: ADMINISTRATIVE | Facility: HOSPITAL | Age: 73
End: 2023-02-28
Payer: MEDICARE

## 2023-03-13 NOTE — PROGRESS NOTES
FAMILY MEDICINE  [facility]    Reason for visit:   Chief Complaint   Patient presents with    Follow-up     Back pain        HPI: Belen Salazar is a 73 y.o. female  - with thyroid nodules and osteoporosis presents for follow-up upper back/shoulder pain    ENT: Dr. Downs  Dermatology Dr. Mckeon    Since last visit Belen Salazar started PT at [facility] on 2/23/23 after her upper back pain persisted and she started to have tingling down her left arm.  Cervical x-ray and thoracic x-ray were completed.  Thoracic x-ray was unremarkable.  Cervical x-ray showed degenerative disc disease with some mild foraminal narrowing at C5-C6.  She is had some progress physical therapy but pain persists.  Her physical therapy has been extended another 4 weeks.  She had some increased pain and soreness after use of the TENs unit but has not had any dry needling.  She reports normal range of motion.  Pain seems to be more significant when she is leaning over.  She reports she had increased pain when she was leaning to change the time on her microwave.  She denies any weakness.  She continues to do her home exercises.  I personally reviewed the physical therapy notes    She also reports a mild cough that started yesterday.  She is had some increased nasal congestion and sneezing.  The cough is nonproductive.  She denies any fevers, chills or muscle aches.    The patient's last visit with me was on 1/30/2023  Belen Salazar reports that pain started about 4 days ago. She reports that initially it was severe. She could lie on her left side at night but when she changed positions she would get a severe pain between her scapula on the left. She reports that it was difficult to get comfortable. She notes that she has been working on house repairs since [place] and she fell about 1-2 weeks ago striking her left side of her face. She does not recall striking her shoulder or  "back but she did have her left arm extended. However pain only started recently. She reports pain is sharp to sore 8/10 at worst and currently 0/10. She reports today she went to the gym and it was feeling much better. She has not tried any OTC medications. She denies any weakness, numbness, tingling or swelling.          Review of Systems   All other systems reviewed and are negative.    Social History     Social History Narrative    Lives in [place] with her adopted daughter and grandson. 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 She is an ex- cigarette smoker.   Parkinsons disease in 2017.          ALLERGIES:   Review of patient's allergies indicates:  No Known Allergies    MEDS:     Current Outpatient Medications:     RESTASIS 0.05 % ophthalmic emulsion, Place 1 drop into both eyes 2 (two) times daily., Disp: , Rfl:     Vital signs:   Vitals:    23 0827   BP: 122/76   Pulse: 86   SpO2: 98%   Weight: 62.9 kg (138 lb 12.5 oz)   Height: 5' 5" (1.651 m)       Body mass index is 23.09 kg/m².    PHYSICAL EXAM:     Physical Exam  Constitutional:       General: She is not in acute distress.  HENT:      Head: Normocephalic and atraumatic.      Right Ear: Tympanic membrane and ear canal normal.      Left Ear: Tympanic membrane and ear canal normal.      Nose: Rhinorrhea present. Rhinorrhea is clear.      Right Sinus: No maxillary sinus tenderness or frontal sinus tenderness.      Left Sinus: No maxillary sinus tenderness or frontal sinus tenderness.   Cardiovascular:      Rate and Rhythm: Normal rate and regular rhythm.      Heart sounds: Normal heart sounds. No murmur heard.    No friction rub. No gallop.   Pulmonary:      Effort: Pulmonary effort is normal.      Breath sounds: Normal breath sounds. No wheezing, rhonchi or rales.   Musculoskeletal:      Cervical back: Neck supple. No tenderness. Normal range of motion.      Thoracic back: Spasms and tenderness present. No " swelling or lacerations. Normal range of motion.        Back:       Right lower leg: No edema.      Left lower leg: No edema.      Comments: Spurling's test: Negative bilaterally   Neurological:      Mental Status: She is alert.         PERTINENT RESULTS:   X-Ray Cervical Spine Complete 5 view  Narrative: EXAMINATION:  XR CERVICAL SPINE COMPLETE 5 VIEW    CLINICAL HISTORY:  . Anesthesia of skin    TECHNIQUE:  AP, Lateral, bilateral oblique and open mouth views of the cervical spine were performed.    COMPARISON:  None    FINDINGS:  There is 1-2 mm anterolisthesis of C4 on C5 and C5 on C6.  Posterior vertebral alignment is otherwise satisfactory.  Vertebral body heights are well maintained.  There is no evidence for acute fracture or bone destruction.  The odontoid is intact.  There is disc space narrowing present at the C4-5, C5-6, and C6-7 levels as well as at the C7-T1 level.  Small marginal osteophytes are also present at these levels.  There is facet arthropathy.  There is neural foraminal narrowing present at the C5-6 level on the left due to the uncinate joint hypertrophy and facet arthropathy.  Prevertebral soft tissues are unremarkable.  Impression: Minimal, 1-2 mm, grade 1 anterolisthesis of C4 on C5 and C5 on C6.    Cervical spondylosis which appears most pronounced within the mid to lower cervical spine.    Mild neural foraminal narrowing present at the C5-6 level on the left due to uncinate joint hypertrophy and facet arthropathy.    Electronically signed by: Lenny Teresa MD  Date:    02/10/2023  Time:    09:15  X-Ray Thoracic Spine AP Lateral  Narrative: EXAMINATION:  XR THORACIC SPINE AP LATERAL    CLINICAL HISTORY:  Pain in thoracic spine    TECHNIQUE:  AP and lateral views of the thoracic spine were performed.    COMPARISON:  None    FINDINGS:  Posterior vertebral alignment is satisfactory.  There is prominent S shaped scoliosis of the thoracolumbar spine with dextro convexity of the lower thoracic  and upper lumbar spine with mild levo convexity of the upper thoracic spine.  Vertebral body heights are well maintained.  There is no evidence for acute fracture or bone destruction.  There are degenerative changes with small marginal osteophytes.  No abnormal paraspinal masses are identified.  Impression: S-shaped scoliosis.    No evidence for acute fracture, bone destruction, or subluxation.    Thoracic spondylosis.    Electronically signed by: eLnny Teresa MD  Date:    02/10/2023  Time:    09:12        ASSESSMENT/PLAN:    1. Upper back pain  Overview:  - 02/10/2023 cervical x-ray: Cervical spondylosis.  Mild anterolisthesis of C4 on C5 and C5 on C6.  Mild neural foraminal narrowing present at C5-C6 on the left due to indicated joint hypertrophy and facet arthropathy  - 03/10/2023 thoracic x-ray:  S shaped scoliosis.  Thoracic spondylosis  - continue with physical therapy  - discussed evaluation by pain management persists      2. Thoracogenic scoliosis of thoracic region  Overview:  - 03/10/2023 thoracic x-ray:  S shaped scoliosis.  Thoracic spondylosis      3. Thoracic spondylosis  Overview:  - 03/10/2023 thoracic x-ray:  S shaped scoliosis.  Thoracic spondylosis      4. Osteoarthritis of spine with radiculopathy, cervical region  Overview:  - 2/10/23 C-spine Xray: Minimal, 1-2 mm, grade 1 anterolisthesis of C4 on C5 and C5 on C6.  Cervical spondylosis which appears most pronounced within the mid to lower cervical spine.  Mild neural foraminal narrowing present at the C5-6 level on the left due to uncinate joint hypertrophy and facet arthropathy.  - Spurling's test negative however presenting symptoms consistent with radiculopathy  - continue with physical therapy   - If persists recommend evaluation by pain management         5. Upper respiratory tract infection, unspecified type  Overview:  - appears viral   - supportive care  - questions elicited and answered  - encouraged to patient to notify me of any  questions or concerns        Recommend breast cancer screening, colon cancer screening, bone density and she declined    Vaccines recommended: covid-19    Follow-up  4 weeks or sooner if any concerns    This note is dictated using the M*Modal Fluency Direct word recognition program. There are word recognition mistakes that are occasionally missed on review.    Dr. Kasandra Palacios D.O.   [facility]

## 2023-03-14 ENCOUNTER — OFFICE VISIT (OUTPATIENT)
Dept: PRIMARY CARE CLINIC | Facility: CLINIC | Age: 73
End: 2023-03-14
Attending: FAMILY MEDICINE
Payer: MEDICARE

## 2023-03-14 VITALS
DIASTOLIC BLOOD PRESSURE: 76 MMHG | OXYGEN SATURATION: 98 % | HEIGHT: 65 IN | BODY MASS INDEX: 23.12 KG/M2 | HEART RATE: 86 BPM | SYSTOLIC BLOOD PRESSURE: 122 MMHG | WEIGHT: 138.75 LBS

## 2023-03-14 DIAGNOSIS — M41.34 THORACOGENIC SCOLIOSIS OF THORACIC REGION: ICD-10-CM

## 2023-03-14 DIAGNOSIS — J06.9 UPPER RESPIRATORY TRACT INFECTION, UNSPECIFIED TYPE: ICD-10-CM

## 2023-03-14 DIAGNOSIS — M47.22 OSTEOARTHRITIS OF SPINE WITH RADICULOPATHY, CERVICAL REGION: ICD-10-CM

## 2023-03-14 DIAGNOSIS — M54.9 UPPER BACK PAIN: Primary | ICD-10-CM

## 2023-03-14 DIAGNOSIS — M47.814 THORACIC SPONDYLOSIS: ICD-10-CM

## 2023-03-14 PROCEDURE — 99214 OFFICE O/P EST MOD 30 MIN: CPT | Mod: S$GLB,,, | Performed by: FAMILY MEDICINE

## 2023-03-14 PROCEDURE — 3008F BODY MASS INDEX DOCD: CPT | Mod: CPTII,S$GLB,, | Performed by: FAMILY MEDICINE

## 2023-03-14 PROCEDURE — 99214 PR OFFICE/OUTPT VISIT, EST, LEVL IV, 30-39 MIN: ICD-10-PCS | Mod: S$GLB,,, | Performed by: FAMILY MEDICINE

## 2023-03-14 PROCEDURE — 3078F DIAST BP <80 MM HG: CPT | Mod: CPTII,S$GLB,, | Performed by: FAMILY MEDICINE

## 2023-03-14 PROCEDURE — 99999 PR PBB SHADOW E&M-EST. PATIENT-LVL III: ICD-10-PCS | Mod: PBBFAC,,, | Performed by: FAMILY MEDICINE

## 2023-03-14 PROCEDURE — 1157F PR ADVANCE CARE PLAN OR EQUIV PRESENT IN MEDICAL RECORD: ICD-10-PCS | Mod: CPTII,S$GLB,, | Performed by: FAMILY MEDICINE

## 2023-03-14 PROCEDURE — 3074F SYST BP LT 130 MM HG: CPT | Mod: CPTII,S$GLB,, | Performed by: FAMILY MEDICINE

## 2023-03-14 PROCEDURE — 3008F PR BODY MASS INDEX (BMI) DOCUMENTED: ICD-10-PCS | Mod: CPTII,S$GLB,, | Performed by: FAMILY MEDICINE

## 2023-03-14 PROCEDURE — 1157F ADVNC CARE PLAN IN RCRD: CPT | Mod: CPTII,S$GLB,, | Performed by: FAMILY MEDICINE

## 2023-03-14 PROCEDURE — 3074F PR MOST RECENT SYSTOLIC BLOOD PRESSURE < 130 MM HG: ICD-10-PCS | Mod: CPTII,S$GLB,, | Performed by: FAMILY MEDICINE

## 2023-03-14 PROCEDURE — 1125F PR PAIN SEVERITY QUANTIFIED, PAIN PRESENT: ICD-10-PCS | Mod: CPTII,S$GLB,, | Performed by: FAMILY MEDICINE

## 2023-03-14 PROCEDURE — 1160F PR REVIEW ALL MEDS BY PRESCRIBER/CLIN PHARMACIST DOCUMENTED: ICD-10-PCS | Mod: CPTII,S$GLB,, | Performed by: FAMILY MEDICINE

## 2023-03-14 PROCEDURE — 3078F PR MOST RECENT DIASTOLIC BLOOD PRESSURE < 80 MM HG: ICD-10-PCS | Mod: CPTII,S$GLB,, | Performed by: FAMILY MEDICINE

## 2023-03-14 PROCEDURE — 1159F MED LIST DOCD IN RCRD: CPT | Mod: CPTII,S$GLB,, | Performed by: FAMILY MEDICINE

## 2023-03-14 PROCEDURE — 1159F PR MEDICATION LIST DOCUMENTED IN MEDICAL RECORD: ICD-10-PCS | Mod: CPTII,S$GLB,, | Performed by: FAMILY MEDICINE

## 2023-03-14 PROCEDURE — 1125F AMNT PAIN NOTED PAIN PRSNT: CPT | Mod: CPTII,S$GLB,, | Performed by: FAMILY MEDICINE

## 2023-03-14 PROCEDURE — 1160F RVW MEDS BY RX/DR IN RCRD: CPT | Mod: CPTII,S$GLB,, | Performed by: FAMILY MEDICINE

## 2023-03-14 PROCEDURE — 99999 PR PBB SHADOW E&M-EST. PATIENT-LVL III: CPT | Mod: PBBFAC,,, | Performed by: FAMILY MEDICINE

## 2023-03-30 ENCOUNTER — PES CALL (OUTPATIENT)
Dept: ADMINISTRATIVE | Facility: CLINIC | Age: 73
End: 2023-03-30
Payer: MEDICARE

## 2023-04-14 ENCOUNTER — PES CALL (OUTPATIENT)
Dept: ADMINISTRATIVE | Facility: CLINIC | Age: 73
End: 2023-04-14
Payer: MEDICARE

## 2023-06-27 ENCOUNTER — PES CALL (OUTPATIENT)
Dept: ADMINISTRATIVE | Facility: CLINIC | Age: 73
End: 2023-06-27
Payer: MEDICARE

## 2023-08-14 ENCOUNTER — PATIENT MESSAGE (OUTPATIENT)
Dept: ADMINISTRATIVE | Facility: HOSPITAL | Age: 73
End: 2023-08-14
Payer: MEDICARE

## 2023-08-16 ENCOUNTER — PATIENT OUTREACH (OUTPATIENT)
Dept: ADMINISTRATIVE | Facility: HOSPITAL | Age: 73
End: 2023-08-16
Payer: MEDICARE

## 2023-08-16 DIAGNOSIS — Z12.31 ENCOUNTER FOR SCREENING MAMMOGRAM FOR BREAST CANCER: Primary | ICD-10-CM

## 2023-08-16 DIAGNOSIS — Z78.0 POSTMENOPAUSAL: ICD-10-CM

## 2023-08-17 ENCOUNTER — PATIENT OUTREACH (OUTPATIENT)
Dept: ADMINISTRATIVE | Facility: HOSPITAL | Age: 73
End: 2023-08-17
Payer: MEDICARE

## 2023-08-17 ENCOUNTER — PATIENT MESSAGE (OUTPATIENT)
Dept: ADMINISTRATIVE | Facility: HOSPITAL | Age: 73
End: 2023-08-17
Payer: MEDICARE

## 2023-08-17 ENCOUNTER — PATIENT MESSAGE (OUTPATIENT)
Dept: PRIMARY CARE CLINIC | Facility: CLINIC | Age: 73
End: 2023-08-17
Payer: MEDICARE

## 2023-08-17 DIAGNOSIS — K21.9 GASTROESOPHAGEAL REFLUX DISEASE WITHOUT ESOPHAGITIS: ICD-10-CM

## 2023-08-17 DIAGNOSIS — Z12.12 ENCOUNTER FOR COLORECTAL CANCER SCREENING: Primary | ICD-10-CM

## 2023-08-17 DIAGNOSIS — E04.2 MULTIPLE THYROID NODULES: Primary | ICD-10-CM

## 2023-08-17 DIAGNOSIS — Z12.11 SCREENING FOR COLON CANCER: ICD-10-CM

## 2023-08-17 DIAGNOSIS — M81.0 AGE-RELATED OSTEOPOROSIS WITHOUT CURRENT PATHOLOGICAL FRACTURE: ICD-10-CM

## 2023-08-17 DIAGNOSIS — Z12.11 ENCOUNTER FOR COLORECTAL CANCER SCREENING: Primary | ICD-10-CM

## 2023-08-22 ENCOUNTER — PATIENT MESSAGE (OUTPATIENT)
Dept: PRIMARY CARE CLINIC | Facility: CLINIC | Age: 73
End: 2023-08-22
Payer: MEDICARE

## 2023-08-31 ENCOUNTER — PATIENT OUTREACH (OUTPATIENT)
Dept: ADMINISTRATIVE | Facility: HOSPITAL | Age: 73
End: 2023-08-31
Payer: MEDICARE

## 2023-09-01 LAB — HEMOCCULT STL QL IA: NEGATIVE

## 2023-09-11 PROBLEM — J06.9 UPPER RESPIRATORY TRACT INFECTION: Status: RESOLVED | Noted: 2023-03-14 | Resolved: 2023-09-11

## 2023-09-11 NOTE — PROGRESS NOTES
VIRTUAL/TELEMEDICINE VISIT   FAMILY MEDICINE  [facility]    The patient location is: [place]  The chief complaint leading to consultation is:   Chief Complaint   Patient presents with    Follow-up     Labs      Visit type: Virtual visit with synchronous audio and video   Total time spent: 30 minute  Each patient to whom he or she provides medical services by telemedicine is:  (1) informed of the relationship between the physician and patient and the respective role of any other health care provider with respect to management of the patient; and (2) notified that he or she may decline to receive medical services by telemedicine and may withdraw from such care at any time.    Reason for visit:   Chief Complaint   Patient presents with    Follow-up     Labs        SUBJECTIVE: Belen Salazar is a 73 y.o. female  - with thyroid nodules and osteoporosis presents for follow-up recent labs and discussed potential sleep issues    ENT: Dr. Downs  Dermatology Dr. Mckeon  Recommend breast cancer screening and bone density and she declined. She did complete her FIT kit this year.    Since last visit Belen Salazar reports that her upper back pain has resolved.  She would like to review her labs that were recently done.  She is taking calcium supplementation Caltrate with vitamin-D when she calls.  She was unsure if she should take supplemental vitamin-D on top of that.    She also reports some concerns with sleeping.  She reports well her  was alive he did mention that she would stop breathing in her sleep.  She has a new smart watch that monitors her sleep, snoring and oxygen saturation.  She is noticed that she does have snoring.  She also has noticed that her oxygen saturation decreases since the 80s and last night decreased to 74.  She is not had a sleep evaluation in the past.  She feels rested when she wakes in the morning however mid day she feels more fatigued and sleepy  around 2:00 p.m..  She denies any chest pain, shortness of breath or dyspnea on exertion.        Review of Systems   HENT:  Negative for hearing loss.    Eyes:  Negative for discharge.   Respiratory:  Negative for wheezing.    Cardiovascular:  Negative for chest pain and palpitations.   Gastrointestinal:  Negative for blood in stool, constipation, diarrhea and vomiting.   Genitourinary:  Negative for dysuria and hematuria.   Musculoskeletal:  Negative for neck pain.   Neurological:  Negative for weakness and headaches.   Endo/Heme/Allergies:  Negative for polydipsia.   All other systems reviewed and are negative.        HISTORY:   Past Medical History:   Diagnosis Date    Cataract     bilateral, removed 2012    Depression 3/6/2015    Hypotension, unspecified     Miscarriage     3 in the early 1980s    Osteoporosis     diagnosed 2006       Past Surgical History:   Procedure Laterality Date    DILATION AND CURETTAGE OF UTERUS      1 or 2 following miscarriage(s)    EYE SURGERY      cataract removal 2012    TONSILLECTOMY         Family History   Problem Relation Age of Onset    COPD Father     Cancer Maternal Uncle     Cancer Maternal Grandmother     Heart disease Maternal Grandfather     Diabetes Paternal Grandmother     Hypertension Paternal Grandmother     Heart disease Paternal Grandfather     Stroke Paternal Grandfather     Hypertension Mother     COPD Mother     Neuropathy Brother     No Known Problems Sister     Scoliosis Sister     No Known Problems Brother     No Known Problems Brother     No Known Problems Brother     No Known Problems Brother        Social History     Tobacco Use    Smoking status: Former     Current packs/day: 0.75     Average packs/day: 0.8 packs/day for 30.0 years (22.5 ttl pk-yrs)     Types: Cigarettes     Passive exposure: Never    Smokeless tobacco: Never    Tobacco comments:     2013   Substance Use Topics    Alcohol use: Yes     Alcohol/week: 14.0 standard drinks of alcohol      Types: 14 Glasses of wine per week    Drug use: No       Social History     Social History Narrative    Lives in [place] with her adopted daughter and grandson. 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 She is an ex- cigarette smoker.   Parkinsons disease in 2017.        ALLERGIES:   Review of patient's allergies indicates:  No Known Allergies    MEDS:   Current Outpatient Medications on File Prior to Visit   Medication Sig Dispense Refill Last Dose    RESTASIS 0.05 % ophthalmic emulsion Place 1 drop into both eyes 2 (two) times daily.          Vital signs:   There were no vitals filed for this visit.  There is no height or weight on file to calculate BMI.    PHYSICAL EXAM:     Physical Exam  Constitutional:       General: She is not in acute distress.  Pulmonary:      Effort: Pulmonary effort is normal. No respiratory distress.   Neurological:      Mental Status: She is alert.   Psychiatric:         Speech: Speech normal.           PERTINENT RESULTS:   No visits with results within 1 Week(s) from this visit.   Latest known visit with results is:   Lab Visit on 2023   Component Date Value Ref Range Status    TSH 2023 1.670  0.400 - 4.000 uIU/mL Final    Comment: Warning:  Heterophilic antibodies in serum or plasma of   certain individuals are known to cause interference with   immunoassays. These antibodies may be present in blood samples   from individuals regularly exposed to animal or who have been   treated with animal products.     Patients taking high doses of supplemental biotin may have  negatively biased results.       Sodium 2023 136  136 - 145 mmol/L Final    Potassium 2023 4.4  3.5 - 5.1 mmol/L Final    Chloride 2023 102  95 - 110 mmol/L Final    CO2 2023 26  23 - 29 mmol/L Final    Glucose 2023 98  70 - 110 mg/dL Final    BUN 2023 22 (H)  7 - 17 mg/dL Final    Creatinine 2023 0.69  0.50 - 1.40 mg/dL Final     Calcium 08/22/2023 8.6 (L)  8.7 - 10.5 mg/dL Final    Total Protein 08/22/2023 7.3  6.0 - 8.4 g/dL Final    Albumin 08/22/2023 4.2  3.5 - 5.2 g/dL Final    Total Bilirubin 08/22/2023 1.2 (H)  0.1 - 1.0 mg/dL Final    Comment: For infants and newborns, interpretation of results should be based  on gestational age, weight and in agreement with clinical  observations.    Premature Infant recommended reference ranges:  Up to 24 hours.............<8.0 mg/dL  Up to 48 hours............<12.0 mg/dL  3-5 days..................<15.0 mg/dL  6-29 days.................<15.0 mg/dL      Alkaline Phosphatase 08/22/2023 88  38 - 126 U/L Final    AST 08/22/2023 31  15 - 46 U/L Final    ALT 08/22/2023 16  10 - 44 U/L Final    Anion Gap 08/22/2023 8  8 - 16 mmol/L Final    eGFR 08/22/2023 >60.0  >60 mL/min/1.73 m^2 Final    WBC 08/22/2023 5.34  3.90 - 12.70 K/uL Final    RBC 08/22/2023 4.35  4.00 - 5.40 M/uL Final    Hemoglobin 08/22/2023 13.8  12.0 - 16.0 g/dL Final    Hematocrit 08/22/2023 41.2  37.0 - 48.5 % Final    MCV 08/22/2023 95  82 - 98 fL Final    MCH 08/22/2023 31.7 (H)  27.0 - 31.0 pg Final    MCHC 08/22/2023 33.5  32.0 - 36.0 g/dL Final    RDW 08/22/2023 12.3  11.5 - 14.5 % Final    Platelets 08/22/2023 285  150 - 450 K/uL Final    MPV 08/22/2023 9.7  9.2 - 12.9 fL Final    Immature Granulocytes 08/22/2023 0.4  0.0 - 0.5 % Final    Gran # (ANC) 08/22/2023 2.5  1.8 - 7.7 K/uL Final    Immature Grans (Abs) 08/22/2023 0.02  0.00 - 0.04 K/uL Final    Comment: Mild elevation in immature granulocytes is non specific and   can be seen in a variety of conditions including stress response,   acute inflammation, trauma and pregnancy. Correlation with other   laboratory and clinical findings is essential.      Lymph # 08/22/2023 2.2  1.0 - 4.8 K/uL Final    Mono # 08/22/2023 0.5  0.3 - 1.0 K/uL Final    Eos # 08/22/2023 0.1  0.0 - 0.5 K/uL Final    Baso # 08/22/2023 0.02  0.00 - 0.20 K/uL Final    nRBC 08/22/2023 0  0 /100 WBC  Final    Gran % 08/22/2023 47.5  38.0 - 73.0 % Final    Lymph % 08/22/2023 40.8  18.0 - 48.0 % Final    Mono % 08/22/2023 9.4  4.0 - 15.0 % Final    Eosinophil % 08/22/2023 1.5  0.0 - 8.0 % Final    Basophil % 08/22/2023 0.4  0.0 - 1.9 % Final    Differential Method 08/22/2023 Automated   Final    Vit D, 25-Hydroxy 08/22/2023 33  30 - 96 ng/mL Final    Comment: Vitamin D deficiency.........<10 ng/mL                              Vitamin D insufficiency......10-29 ng/mL       Vitamin D sufficiency........> or equal to 30 ng/mL  Vitamin D toxicity............>100 ng/mL         ASSESSMENT/PLAN:    1. Serum total bilirubin elevated  Overview:  - acute on chronic and appears to have had elevated total bilirubin in the past   -asymptomatic   -recommend repeat total bilirubin with direct bilirubin    Orders:  -     Bilirubin, Total; Future; Expected date: 09/14/2023  -     Bilirubin, Direct; Future; Expected date: 09/14/2023    2. Hypocalcemia  Overview:  Lab Results   Component Value Date    CALCIUM 8.6 (L) 08/22/2023     - prior issues with low calcium   -recommend repeat in 1 month with ionized calcium   -vitamin-D level normal    Orders:  -     Calcium, Ionized; Future; Expected date: 09/14/2023    3. Multiple thyroid nodules  Overview:  - 2/5/2020 Thyroid US: The right and left lobes of the thyroid measure 4.5 x 1.3 x 1.5 cm and 5 x 0.9 x 1.1 cm respectively.  The right lobe has a homogeneous echotexture.  There is a heterogeneous 0.6 x 0.8 x 1.3 cm partially cystic nodule within the isthmus.  There are 3 subcentimeter hypoechoic nodules within the left lobe the largest of which measuring 0.9 cm.  There are no enlarged cervical lymph nodes.   - asymptomatic  - recommend repeat thyroid ultrasound    Orders:  -     US Soft Tissue Head Neck Thyroid; Future; Expected date: 09/14/2023    4. Snoring  -     Ambulatory referral/consult to Sleep Disorders; Future; Expected date: 09/21/2023    5. Witnessed episode of  apnea  Overview:  - appears to have notable desaturation while sleeping   -recommend sleep medicine evaluation    Orders:  -     Ambulatory referral/consult to Sleep Disorders; Future; Expected date: 09/21/2023            ORDERS:   Orders Placed This Encounter    US Soft Tissue Head Neck Thyroid    Calcium, Ionized    Bilirubin, Total    Bilirubin, Direct    Ambulatory referral/consult to Sleep Disorders       Vaccines recommended: flu and covid-19    Follow-up in 1 month already scheduled or sooner if any concerns.      This note is dictated using the M*Modal Fluency Direct word recognition program. There are word recognition mistakes that are occasionally missed on review.    Dr. Kasandra Palacios D.O.   Family Medicine

## 2023-09-14 ENCOUNTER — TELEPHONE (OUTPATIENT)
Dept: ORTHOPEDICS | Facility: CLINIC | Age: 73
End: 2023-09-14
Payer: MEDICARE

## 2023-09-14 ENCOUNTER — TELEPHONE (OUTPATIENT)
Dept: PRIMARY CARE CLINIC | Facility: CLINIC | Age: 73
End: 2023-09-14

## 2023-09-14 ENCOUNTER — OFFICE VISIT (OUTPATIENT)
Dept: PRIMARY CARE CLINIC | Facility: CLINIC | Age: 73
End: 2023-09-14
Attending: FAMILY MEDICINE
Payer: MEDICARE

## 2023-09-14 DIAGNOSIS — E83.51 HYPOCALCEMIA: ICD-10-CM

## 2023-09-14 DIAGNOSIS — R06.81 WITNESSED EPISODE OF APNEA: ICD-10-CM

## 2023-09-14 DIAGNOSIS — R17 SERUM TOTAL BILIRUBIN ELEVATED: Primary | ICD-10-CM

## 2023-09-14 DIAGNOSIS — E04.2 MULTIPLE THYROID NODULES: ICD-10-CM

## 2023-09-14 DIAGNOSIS — R06.83 SNORING: ICD-10-CM

## 2023-09-14 PROBLEM — S46.812A STRAIN OF LEFT TRAPEZIUS MUSCLE: Status: RESOLVED | Noted: 2023-01-30 | Resolved: 2023-09-14

## 2023-09-14 PROBLEM — M79.18 RHOMBOID MUSCLE PAIN: Status: RESOLVED | Noted: 2023-01-30 | Resolved: 2023-09-14

## 2023-09-14 PROBLEM — M54.6 ACUTE LEFT-SIDED THORACIC BACK PAIN: Status: RESOLVED | Noted: 2023-02-16 | Resolved: 2023-09-14

## 2023-09-14 PROBLEM — M89.8X1 PAIN OF LEFT SCAPULA: Status: RESOLVED | Noted: 2023-02-16 | Resolved: 2023-09-14

## 2023-09-14 PROBLEM — M54.9 UPPER BACK PAIN: Status: RESOLVED | Noted: 2023-01-30 | Resolved: 2023-09-14

## 2023-09-14 PROCEDURE — 1159F PR MEDICATION LIST DOCUMENTED IN MEDICAL RECORD: ICD-10-PCS | Mod: CPTII,95,, | Performed by: FAMILY MEDICINE

## 2023-09-14 PROCEDURE — 1160F RVW MEDS BY RX/DR IN RCRD: CPT | Mod: CPTII,95,, | Performed by: FAMILY MEDICINE

## 2023-09-14 PROCEDURE — 1159F MED LIST DOCD IN RCRD: CPT | Mod: CPTII,95,, | Performed by: FAMILY MEDICINE

## 2023-09-14 PROCEDURE — 99214 PR OFFICE/OUTPT VISIT, EST, LEVL IV, 30-39 MIN: ICD-10-PCS | Mod: 95,,, | Performed by: FAMILY MEDICINE

## 2023-09-14 PROCEDURE — 1157F PR ADVANCE CARE PLAN OR EQUIV PRESENT IN MEDICAL RECORD: ICD-10-PCS | Mod: CPTII,95,, | Performed by: FAMILY MEDICINE

## 2023-09-14 PROCEDURE — 1157F ADVNC CARE PLAN IN RCRD: CPT | Mod: CPTII,95,, | Performed by: FAMILY MEDICINE

## 2023-09-14 PROCEDURE — 1160F PR REVIEW ALL MEDS BY PRESCRIBER/CLIN PHARMACIST DOCUMENTED: ICD-10-PCS | Mod: CPTII,95,, | Performed by: FAMILY MEDICINE

## 2023-09-14 PROCEDURE — 99214 OFFICE O/P EST MOD 30 MIN: CPT | Mod: 95,,, | Performed by: FAMILY MEDICINE

## 2023-09-14 NOTE — Clinical Note
Please scheduled 1. Sleep medicine [place] if possible 2. Labs prior to visit with me 10/11/23 3. Thyroid US any time

## 2023-09-14 NOTE — TELEPHONE ENCOUNTER
----- Message from Kasandra Palacios DO sent at 9/14/2023  8:08 AM CDT -----  Please scheduled  1. Sleep medicine [place] if possible  2. Labs prior to visit with me 10/11/23  3. Thyroid US any time

## 2023-09-21 ENCOUNTER — PATIENT MESSAGE (OUTPATIENT)
Dept: PRIMARY CARE CLINIC | Facility: CLINIC | Age: 73
End: 2023-09-21
Payer: MEDICARE

## 2023-09-21 DIAGNOSIS — E04.2 MULTIPLE THYROID NODULES: ICD-10-CM

## 2023-10-09 NOTE — PROGRESS NOTES
FAMILY MEDICINE  [facility]    Reason for visit:   Chief Complaint   Patient presents with    Follow-up    lack of sleep    Follow-up labs    Dysphagia    Throat irritation         SUBJECTIVE: Belen Salazar is a 73 y.o. female  - with thyroid nodules and osteoporosis presents for follow-up recent labs and discussed potential sleep issues    ENT: Dr. Downs  Dermatology Dr. Mckeon  Recommend breast cancer screening and  she declined.     Belen Salazar reports that she still has sleep issues and she is scheduled to sleep sleep medicine 01/19/2024.  She had concerns for snoring and interrupted sleep so she plans on getting asleep study.  However she reports that she also has difficulty staying asleep.  She reports that she typically wakes around 2-3 a.m. on some nights.  She is difficult time going back to sleep on those days.  However she also admits that on those days she will take the drink coffee late in the afternoon to keep herself awake.  She exercises early in the morning.  She occasionally will take a nap during the day when she does have early morning awakenings.    She had a repeat total bilirubin, direct bilirubin and calcium levels done today.  She denies any right upper quadrant pain, unintentional weight loss, nausea vomiting, fevers or changes in bowel movement.  She has had elevated total bilirubin in the past.  It has steadily been increasing over the last year.    She also reports that she still has a chronic tingling in the back of her throat that causes her to cough.  It has been going on for at least the last 3-4 years.  She was evaluated by ENT in 2020 however she does not recall what had happened.  It appears that ENT diagnosed her with silent laryngeal reflux.  She was started on pantoprazole 40 mg daily at that time.  She reports she does not recall the medication was helpful or not.  I reviewed ENT evaluation 02/06/2020.  She also reports in the  meantime that she is been having some difficulty swallowing.  She reports it has been several years which is why she does not like to take any pills.  She reports it seems to get stuck right just below her voice box.  She reports that is not all the time.  She does not have any difficulties with liquids.  She is never had an EGD.  When her symptoms initially started she did have a baseline chest x-ray that was clear.            Review of Systems   HENT:  Negative for hearing loss.    Eyes:  Negative for discharge.   Respiratory:  Negative for wheezing.    Cardiovascular:  Negative for chest pain and palpitations.   Gastrointestinal:  Negative for blood in stool, constipation, diarrhea and vomiting.   Genitourinary:  Negative for dysuria and hematuria.   Musculoskeletal:  Negative for neck pain.   Neurological:  Negative for weakness and headaches.   Endo/Heme/Allergies:  Negative for polydipsia.   All other systems reviewed and are negative.      HEALTH MAINTENANCE:   Health Maintenance   Topic Date Due    Mammogram  03/22/2023    Colorectal Cancer Screening  08/27/2024    DEXA Scan  08/17/2026    Lipid Panel  03/02/2027    TETANUS VACCINE  03/09/2030    Hepatitis C Screening  Completed    Shingles Vaccine  Completed     Health Maintenance Topics with due status: Not Due       Topic Last Completion Date    TETANUS VACCINE 03/09/2020    Lipid Panel 03/02/2022    DEXA Scan 08/17/2023    Colorectal Cancer Screening 08/27/2023     Health Maintenance Due   Topic Date Due    Mammogram  03/22/2023    Influenza Vaccine (1) 09/01/2023    COVID-19 Vaccine (4 - 2023-24 season) 09/01/2023       HISTORY:   Past Medical History:   Diagnosis Date    Cataract     bilateral, removed 2012    Depression 3/6/2015    Hypotension, unspecified     Miscarriage     3 in the early 1980s    Osteoporosis     diagnosed 2006       Past Surgical History:   Procedure Laterality Date    DILATION AND CURETTAGE OF UTERUS      1 or 2 following  "miscarriage(s)    EYE SURGERY      cataract removal 2012    TONSILLECTOMY         Family History   Problem Relation Age of Onset    COPD Father     Cancer Maternal Uncle     Cancer Maternal Grandmother     Heart disease Maternal Grandfather     Diabetes Paternal Grandmother     Hypertension Paternal Grandmother     Heart disease Paternal Grandfather     Stroke Paternal Grandfather     Hypertension Mother     COPD Mother     Neuropathy Brother     No Known Problems Sister     Scoliosis Sister     No Known Problems Brother     No Known Problems Brother     No Known Problems Brother     No Known Problems Brother        Social History     Tobacco Use    Smoking status: Former     Current packs/day: 0.75     Average packs/day: 0.8 packs/day for 30.0 years (22.5 ttl pk-yrs)     Types: Cigarettes     Passive exposure: Never    Smokeless tobacco: Never    Tobacco comments:     2013   Substance Use Topics    Alcohol use: Yes     Alcohol/week: 14.0 standard drinks of alcohol     Types: 14 Glasses of wine per week    Drug use: No       Social History     Social History Narrative    Lives in [place] with her adopted daughter and grandson. 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 She is an ex- cigarette smoker.   Parkinsons disease in 2017.        ALLERGIES:   Review of patient's allergies indicates:  No Known Allergies    MEDS:   Current Outpatient Medications on File Prior to Visit   Medication Sig Dispense Refill Last Dose    RESTASIS 0.05 % ophthalmic emulsion Place 1 drop into both eyes 2 (two) times daily.            Vital signs:   Vitals:    10/11/23 0924   BP: 110/68   Pulse: 70   SpO2: 99%   Weight: 61.5 kg (135 lb 11.1 oz)   Height: 5' 5" (1.651 m)     Body mass index is 22.58 kg/m².    PHYSICAL EXAM:     Physical Exam  Vitals reviewed.   Constitutional:       General: She is not in acute distress.  HENT:      Head: Normocephalic and atraumatic.      Right Ear: Tympanic " membrane and ear canal normal.      Left Ear: Tympanic membrane and ear canal normal.   Eyes:      General: No scleral icterus.     Conjunctiva/sclera: Conjunctivae normal.   Neck:      Thyroid: No thyromegaly.      Vascular: No carotid bruit.      Comments: + bilateral enlarged submandibular glands that are nontender, soft and mobile.  Symmetric  Cardiovascular:      Rate and Rhythm: Normal rate and regular rhythm.      Pulses: Normal pulses.      Heart sounds: Normal heart sounds. No murmur heard.     No friction rub. No gallop.   Pulmonary:      Effort: Pulmonary effort is normal.      Breath sounds: Normal breath sounds. No wheezing or rales.   Chest:      Comments: Breasts: breasts appear normal, no suspicious masses, no skin or nipple changes or axillary nodes    Abdominal:      General: Bowel sounds are normal. There is no distension.      Palpations: Abdomen is soft.      Tenderness: There is no abdominal tenderness.   Musculoskeletal:      Cervical back: Normal range of motion and neck supple.      Right lower leg: No edema.      Left lower leg: No edema.   Lymphadenopathy:      Cervical: No cervical adenopathy.   Skin:     General: Skin is warm.      Capillary Refill: Capillary refill takes less than 2 seconds.   Neurological:      Mental Status: She is alert.             PERTINENT RESULTS:   Lab Visit on 10/07/2023   Component Date Value Ref Range Status    Ionized Calcium 10/07/2023 1.20  1.06 - 1.42 mmol/L Final    Total Bilirubin 10/07/2023 1.6 (H)  0.1 - 1.0 mg/dL Final    Comment: For infants and newborns, interpretation of results should be based  on gestational age, weight and in agreement with clinical  observations.    Premature Infant recommended reference ranges:  Up to 24 hours.............<8.0 mg/dL  Up to 48 hours............<12.0 mg/dL  3-5 days..................<15.0 mg/dL  6-29 days.................<15.0 mg/dL      Bilirubin, Direct 10/07/2023 0.3  0.0 - 0.3 mg/dL Final     2/5/2020  Routine     Narrative & Impression  EXAMINATION:  XR CHEST PA AND LATERAL     CLINICAL HISTORY:  Cough     TECHNIQUE:  PA and lateral views of the chest were performed.     FINDINGS:  The lungs are clear.  There is no pneumothorax or pleural fluid.  The cardiac silhouette is not enlarged.  There is an S shaped scoliosis of the thoracolumbar spine.  There is degenerative change throughout the spine.     Impression:     As above.        Electronically signed by: Lenny Loo MD  Date:                                            02/05/2020  Time:                                           14:21    2/5/2020 Routine     Narrative & Impression  EXAMINATION:  US SOFT TISSUE HEAD NECK THYROID     CLINICAL HISTORY:  Iodine-deficiency related diffuse (endemic) goiter     TECHNIQUE:  Ultrasound of the thyroid and cervical lymph nodes was performed.     FINDINGS:  The right and left lobes of the thyroid measure 4.5 x 1.3 x 1.5 cm and 5 x 0.9 x 1.1 cm respectively.  The right lobe has a homogeneous echotexture.  There is a heterogeneous 0.6 x 0.8 x 1.3 cm partially cystic nodule within the isthmus.  There are 3 subcentimeter hypoechoic nodules within the left lobe the largest of which measuring 0.9 cm.  There are no enlarged cervical lymph nodes.     Impression:     Thyroid nodules which do not meet the size criteria for fine needle aspiration.        Electronically signed by: Lenny Loo MD  Date:                                            02/05/2020  Time:                                           15:13    8/17/2023 Routine     Narrative & Impression  EXAMINATION:  DXA BONE DENSITY AXIAL SKELETON 1 OR MORE SITES     CLINICAL HISTORY:  Asymptomatic menopausal state     TECHNIQUE:  DXA scanning was performed over the left hip and lumbar spine.  Review of the images confirms satisfactory positioning and technique.     FINDINGS:  The L1 to L4 vertebral bone mineral density is equal to 1.029 g/cm squared with a T score of -1.3.      The left femoral neck bone mineral density is equal to 0.627 g/cm squared with a T score of -3.0.     There is a 47.9% risk of a major osteoporotic fracture and a 31.8% risk of hip fracture in the next 10 years (FRAX).     Impression:     Lumbar spine osteopenia.     Left femoral neck osteoporosis.        Electronically signed by: Lenny Loo MD  Date:                                            08/17/2023  Time:                                           07:54  ASSESSMENT/PLAN:    1. Adjustment insomnia  Overview:  - counseling on management of her sleep regimen   -appears her sleep cycle has been disrupted   -recommend avoid caffeine in the afternoon   -recommend avoid naps   -recommend regular sleep schedule to start at a precise sleep time as well as waking hours   - okay to try melatonin 6 mg at bedtime but discussed that it takes several months for melatonin to assist with sleep cycle regulation and that she also has to do the work to recycle her sleep cycle   -she does have a appointment with sleep medicine to discuss snoring and concerns for sleep apnea      2. Gastroesophageal reflux disease without esophagitis  Overview:  - laryngeal reflux evaluated by Otolaryngology  - reviewed ENT note 02/06/2020.  Reviewed my note 03/01/2021 which noted that she did improve on pantoprazole 40 mg daily and had discontinued the medication after improvement   -recommend restart pantoprazole 40 mg daily  - questions elicited and answered  - encouraged to patient to notify me of any questions or concerns    Orders:  -     pantoprazole (PROTONIX) 40 MG tablet; Take 1 tablet (40 mg total) by mouth once daily.  Dispense: 90 tablet; Refill: 0    3. Serum total bilirubin elevated  Overview:  Lab Results   Component Value Date    BILITOT 1.6 (H) 10/07/2023     - 10/07/2023 direct bili normal  - acute on chronic and appears to have had elevated total bilirubin in the past   -asymptomatic   - recommend baseline liver  ultrasound    Orders:  -     US Abdomen Limited_Liver; Future; Expected date: 10/11/2023    4. Pharyngeal dysphagia  Overview:  - discussed potential etiologies of pharyngeal dysphagia   -recommend EGD versus modified barium swallow  - declined both at this time she would like to start PPI 1st and if not improved consider further evaluation      5. Hypocalcemia  Overview:  Lab Results   Component Value Date    CALCIUM 8.6 (L) 08/22/2023     - 10/07/2023 ionized calcium 1.2  - resolved      6. Multiple thyroid nodules  Overview:  - 2/5/2020 Thyroid US: The right and left lobes of the thyroid measure 4.5 x 1.3 x 1.5 cm and 5 x 0.9 x 1.1 cm respectively.  The right lobe has a homogeneous echotexture.  There is a heterogeneous 0.6 x 0.8 x 1.3 cm partially cystic nodule within the isthmus.  There are 3 subcentimeter hypoechoic nodules within the left lobe the largest of which measuring 0.9 cm.  There are no enlarged cervical lymph nodes.   - 9/20/23 US: The right and left lobes of the thyroid measure 4.7 x 1.4 x 1.2 cm and 5.1 x 1.2 x 1.1 cm respectively.  There are 3 subcentimeter thyroid nodules on the left.  The right lobe of the thyroid has a homogeneous echotexture.  There are no enlarged cervical lymph nodes.   - asymptomatic  - repeat thyroid ultrasound 3-5 years  Lab Results   Component Value Date    TSH 1.670 08/22/2023    FREET4 0.97 03/04/2021           7. Age-related osteoporosis without current pathological fracture  Overview:  - 8/21/17 Dexa: lumbar and bilateral hip osteoporosis  - 2/11/2020 Dexa: osteoporosis with improved T score  - 8/17/2023:  Osteoporosis left femoral neck.  Osteopenia lumbar spine  - Alendronate 70 mg weekly and she discontinued 2/2022. No side effects and opted not to continue with medical management  - fall prevention  - weight bearing exercises like walking, squats, stair and jogging if able  - over the counter vitamin D3 4375-1035 units daily  - dietary calcium 1200 mg per day  with diet or supplements            ORDERS:   Orders Placed This Encounter    US Abdomen Limited_Liver    pantoprazole (PROTONIX) 40 MG tablet       Vaccines recommended:  Flu and COVID-19.  She declined    Follow-up in 6 weeks or sooner if any concerns.      This note is dictated using the M*Modal Fluency Direct word recognition program. There are word recognition mistakes that are occasionally missed on review.    Dr. Kasandra Palacios D.O.   [facility]

## 2023-10-11 ENCOUNTER — OFFICE VISIT (OUTPATIENT)
Dept: PRIMARY CARE CLINIC | Facility: CLINIC | Age: 73
End: 2023-10-11
Attending: FAMILY MEDICINE
Payer: MEDICARE

## 2023-10-11 VITALS
WEIGHT: 135.69 LBS | HEIGHT: 65 IN | HEART RATE: 70 BPM | DIASTOLIC BLOOD PRESSURE: 68 MMHG | OXYGEN SATURATION: 99 % | SYSTOLIC BLOOD PRESSURE: 110 MMHG | BODY MASS INDEX: 22.61 KG/M2

## 2023-10-11 DIAGNOSIS — E83.51 HYPOCALCEMIA: ICD-10-CM

## 2023-10-11 DIAGNOSIS — F51.02 ADJUSTMENT INSOMNIA: Primary | ICD-10-CM

## 2023-10-11 DIAGNOSIS — K21.9 GASTROESOPHAGEAL REFLUX DISEASE WITHOUT ESOPHAGITIS: ICD-10-CM

## 2023-10-11 DIAGNOSIS — M81.0 AGE-RELATED OSTEOPOROSIS WITHOUT CURRENT PATHOLOGICAL FRACTURE: ICD-10-CM

## 2023-10-11 DIAGNOSIS — R13.13 PHARYNGEAL DYSPHAGIA: ICD-10-CM

## 2023-10-11 DIAGNOSIS — E04.2 MULTIPLE THYROID NODULES: ICD-10-CM

## 2023-10-11 DIAGNOSIS — R17 SERUM TOTAL BILIRUBIN ELEVATED: ICD-10-CM

## 2023-10-11 PROCEDURE — 1159F PR MEDICATION LIST DOCUMENTED IN MEDICAL RECORD: ICD-10-PCS | Mod: CPTII,S$GLB,, | Performed by: FAMILY MEDICINE

## 2023-10-11 PROCEDURE — 3288F PR FALLS RISK ASSESSMENT DOCUMENTED: ICD-10-PCS | Mod: CPTII,S$GLB,, | Performed by: FAMILY MEDICINE

## 2023-10-11 PROCEDURE — 1157F PR ADVANCE CARE PLAN OR EQUIV PRESENT IN MEDICAL RECORD: ICD-10-PCS | Mod: CPTII,S$GLB,, | Performed by: FAMILY MEDICINE

## 2023-10-11 PROCEDURE — 3078F PR MOST RECENT DIASTOLIC BLOOD PRESSURE < 80 MM HG: ICD-10-PCS | Mod: CPTII,S$GLB,, | Performed by: FAMILY MEDICINE

## 2023-10-11 PROCEDURE — 99214 PR OFFICE/OUTPT VISIT, EST, LEVL IV, 30-39 MIN: ICD-10-PCS | Mod: S$GLB,,, | Performed by: FAMILY MEDICINE

## 2023-10-11 PROCEDURE — 3074F PR MOST RECENT SYSTOLIC BLOOD PRESSURE < 130 MM HG: ICD-10-PCS | Mod: CPTII,S$GLB,, | Performed by: FAMILY MEDICINE

## 2023-10-11 PROCEDURE — 1126F PR PAIN SEVERITY QUANTIFIED, NO PAIN PRESENT: ICD-10-PCS | Mod: CPTII,S$GLB,, | Performed by: FAMILY MEDICINE

## 2023-10-11 PROCEDURE — 3074F SYST BP LT 130 MM HG: CPT | Mod: CPTII,S$GLB,, | Performed by: FAMILY MEDICINE

## 2023-10-11 PROCEDURE — 1160F PR REVIEW ALL MEDS BY PRESCRIBER/CLIN PHARMACIST DOCUMENTED: ICD-10-PCS | Mod: CPTII,S$GLB,, | Performed by: FAMILY MEDICINE

## 2023-10-11 PROCEDURE — 1160F RVW MEDS BY RX/DR IN RCRD: CPT | Mod: CPTII,S$GLB,, | Performed by: FAMILY MEDICINE

## 2023-10-11 PROCEDURE — 1126F AMNT PAIN NOTED NONE PRSNT: CPT | Mod: CPTII,S$GLB,, | Performed by: FAMILY MEDICINE

## 2023-10-11 PROCEDURE — 3008F BODY MASS INDEX DOCD: CPT | Mod: CPTII,S$GLB,, | Performed by: FAMILY MEDICINE

## 2023-10-11 PROCEDURE — 1101F PT FALLS ASSESS-DOCD LE1/YR: CPT | Mod: CPTII,S$GLB,, | Performed by: FAMILY MEDICINE

## 2023-10-11 PROCEDURE — 3008F PR BODY MASS INDEX (BMI) DOCUMENTED: ICD-10-PCS | Mod: CPTII,S$GLB,, | Performed by: FAMILY MEDICINE

## 2023-10-11 PROCEDURE — 99214 OFFICE O/P EST MOD 30 MIN: CPT | Mod: S$GLB,,, | Performed by: FAMILY MEDICINE

## 2023-10-11 PROCEDURE — 1159F MED LIST DOCD IN RCRD: CPT | Mod: CPTII,S$GLB,, | Performed by: FAMILY MEDICINE

## 2023-10-11 PROCEDURE — 3078F DIAST BP <80 MM HG: CPT | Mod: CPTII,S$GLB,, | Performed by: FAMILY MEDICINE

## 2023-10-11 PROCEDURE — 99999 PR PBB SHADOW E&M-EST. PATIENT-LVL III: ICD-10-PCS | Mod: PBBFAC,,, | Performed by: FAMILY MEDICINE

## 2023-10-11 PROCEDURE — 1157F ADVNC CARE PLAN IN RCRD: CPT | Mod: CPTII,S$GLB,, | Performed by: FAMILY MEDICINE

## 2023-10-11 PROCEDURE — 99999 PR PBB SHADOW E&M-EST. PATIENT-LVL III: CPT | Mod: PBBFAC,,, | Performed by: FAMILY MEDICINE

## 2023-10-11 PROCEDURE — 3288F FALL RISK ASSESSMENT DOCD: CPT | Mod: CPTII,S$GLB,, | Performed by: FAMILY MEDICINE

## 2023-10-11 PROCEDURE — 1101F PR PT FALLS ASSESS DOC 0-1 FALLS W/OUT INJ PAST YR: ICD-10-PCS | Mod: CPTII,S$GLB,, | Performed by: FAMILY MEDICINE

## 2023-10-11 RX ORDER — PANTOPRAZOLE SODIUM 40 MG/1
40 TABLET, DELAYED RELEASE ORAL DAILY
Qty: 90 TABLET | Refills: 0 | Status: SHIPPED | OUTPATIENT
Start: 2023-10-11 | End: 2023-12-29 | Stop reason: SDUPTHER

## 2023-10-16 ENCOUNTER — DOCUMENTATION ONLY (OUTPATIENT)
Dept: PRIMARY CARE CLINIC | Facility: CLINIC | Age: 73
End: 2023-10-16
Payer: MEDICARE

## 2023-10-16 ENCOUNTER — PATIENT MESSAGE (OUTPATIENT)
Dept: PRIMARY CARE CLINIC | Facility: CLINIC | Age: 73
End: 2023-10-16
Payer: MEDICARE

## 2023-10-16 DIAGNOSIS — K76.89 LIVER CYST: Primary | ICD-10-CM

## 2023-10-16 DIAGNOSIS — R17 SERUM TOTAL BILIRUBIN ELEVATED: ICD-10-CM

## 2023-10-16 DIAGNOSIS — K80.20 CALCULUS OF GALLBLADDER WITHOUT CHOLECYSTITIS WITHOUT OBSTRUCTION: ICD-10-CM

## 2023-11-20 ENCOUNTER — TELEPHONE (OUTPATIENT)
Dept: ADMINISTRATIVE | Facility: CLINIC | Age: 73
End: 2023-11-20
Payer: MEDICARE

## 2023-11-20 NOTE — TELEPHONE ENCOUNTER
Called pt; no answer; could not confirm appt or leave message due to line kept ringing; I was calling to confirm pt's in office [facility] appt on 11/22/23.

## 2023-11-21 NOTE — PROGRESS NOTES
VIRTUAL VISIT/TELEMEDICINE VISIT  FAMILY MEDICINE  [facility]  [facility]    The patient location is: [place]  The chief complaint leading to consultation is:   Chief Complaint   Patient presents with    Follow-up    Gastroesophageal Reflux    Insomnia     Visit type: Virtual visit with synchronous audio and video   Total time spent: 30 minute  Each patient to whom he or she provides medical services by telemedicine is:  (1) informed of the relationship between the physician and patient and the respective role of any other health care provider with respect to management of the patient; and (2) notified that he or she may decline to receive medical services by telemedicine and may withdraw from such care at any time.      Reason for visit:   Chief Complaint   Patient presents with    Follow-up    Gastroesophageal Reflux    Insomnia         SUBJECTIVE: Belen Salazar is a 73 y.o. female  - with thyroid nodules and osteoporosis presents for follow-up follow-up sleep issues as well as chronic cough.    ENT: Dr. Downs  Dermatology Dr. Mckeon  Recommend breast cancer screening and  she declined.     She reports that her sleep issues are stable and she feels fine.  She decided not to start melatonin since she would like to avoid medication.  She has an appointment with sleep medicine 01/19/2024 for an evaluation for sleep apnea since she is had a history of snoring as well.    Since her last visit she did start pantoprazole 40 mg daily for her chronic tingling in the back of her throat and a recurrent dry cough.  This is a chronic issue for the last 3-4 years and she was evaluated by ENT in 2020.  ENT at that time diagnosed her with silent reflux and was started on pantoprazole at that time.  At her last visit with me 10/11/2023 I restarted pantoprazole 40 mg daily.  She reports that it was helpful.  She reports that her cough had improved.  At that visit she also noted difficulty with feeling  like something was getting stuck in her voice box.  She noticed it most consistently with solid foods as well as pills.  However she reports that since starting the pantoprazole she is not had any issues.      Cough  This is a recurrent problem. The current episode started more than 1 year ago. The problem has been waxing and waning. The problem occurs every few hours. The cough is Non-productive. Pertinent negatives include no chest pain, chills, ear congestion, ear pain, fever, headaches, heartburn, hemoptysis, myalgias, nasal congestion, postnasal drip, rash, rhinorrhea, sore throat, shortness of breath, sweats, weight loss or wheezing. Nothing aggravates the symptoms. The treatment provided moderate relief. Her past medical history is significant for bronchitis. There is no history of asthma, bronchiectasis, COPD, emphysema, environmental allergies or pneumonia.       Review of Systems   Constitutional:  Negative for chills, fever and weight loss.   HENT:  Negative for ear pain, hearing loss, postnasal drip, rhinorrhea and sore throat.    Eyes:  Negative for discharge.   Respiratory:  Positive for cough. Negative for hemoptysis, shortness of breath and wheezing.    Cardiovascular:  Negative for chest pain and palpitations.   Gastrointestinal:  Negative for blood in stool, constipation, diarrhea, heartburn and vomiting.   Genitourinary:  Negative for dysuria and hematuria.   Musculoskeletal:  Negative for myalgias and neck pain.   Skin:  Negative for rash.   Neurological:  Negative for weakness and headaches.   Endo/Heme/Allergies:  Negative for environmental allergies and polydipsia.   All other systems reviewed and are negative.      HEALTH MAINTENANCE:   Health Maintenance   Topic Date Due    Mammogram  03/22/2023    Colorectal Cancer Screening  08/27/2024    DEXA Scan  08/17/2026    Lipid Panel  03/02/2027    TETANUS VACCINE  03/09/2030    Hepatitis C Screening  Completed    Shingles Vaccine  Completed      Health Maintenance Topics with due status: Not Due       Topic Last Completion Date    TETANUS VACCINE 03/09/2020    Lipid Panel 03/02/2022    DEXA Scan 08/17/2023    Colorectal Cancer Screening 08/27/2023     Health Maintenance Due   Topic Date Due    RSV Vaccine (Age 60+ and Pregnant patients) (1 - 1-dose 60+ series) Never done    Mammogram  03/22/2023    Influenza Vaccine (1) 09/01/2023    COVID-19 Vaccine (4 - 2023-24 season) 09/01/2023       HISTORY:   Past Medical History:   Diagnosis Date    Cataract     bilateral, removed 2012    Depression 3/6/2015    Hypotension, unspecified     Miscarriage     3 in the early 1980s    Osteoporosis     diagnosed 2006       Past Surgical History:   Procedure Laterality Date    DILATION AND CURETTAGE OF UTERUS      1 or 2 following miscarriage(s)    EYE SURGERY      cataract removal 2012    TONSILLECTOMY         Family History   Problem Relation Age of Onset    COPD Father     Cancer Maternal Uncle     Cancer Maternal Grandmother     Heart disease Maternal Grandfather     Diabetes Paternal Grandmother     Hypertension Paternal Grandmother     Heart disease Paternal Grandfather     Stroke Paternal Grandfather     Hypertension Mother     COPD Mother     Neuropathy Brother     No Known Problems Sister     Scoliosis Sister     No Known Problems Brother     No Known Problems Brother     No Known Problems Brother     No Known Problems Brother        Social History     Tobacco Use    Smoking status: Former     Current packs/day: 0.75     Average packs/day: 0.8 packs/day for 30.0 years (22.5 ttl pk-yrs)     Types: Cigarettes     Passive exposure: Never    Smokeless tobacco: Never    Tobacco comments:     2013   Substance Use Topics    Alcohol use: Yes     Alcohol/week: 14.0 standard drinks of alcohol     Types: 14 Glasses of wine per week    Drug use: No       Social History     Social History Narrative    Lives in [place] with her adopted daughter and grandson. 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 She is an ex- cigarette smoker.   Parkinsons disease in 2017.        ALLERGIES:   Review of patient's allergies indicates:  No Known Allergies    MEDS:   Current Outpatient Medications on File Prior to Visit   Medication Sig Dispense Refill Last Dose    pantoprazole (PROTONIX) 40 MG tablet Take 1 tablet (40 mg total) by mouth once daily. 90 tablet 0 Taking    RESTASIS 0.05 % ophthalmic emulsion Place 1 drop into both eyes 2 (two) times daily.   Taking         Vital signs:   Vitals:    23 0859   Weight: 61.2 kg (135 lb)   Height: 5' 5" (1.651 m)       Body mass index is 22.47 kg/m².    PHYSICAL EXAM:     Physical Exam  Constitutional:       General: She is not in acute distress.  Pulmonary:      Effort: Pulmonary effort is normal. No respiratory distress.   Neurological:      Mental Status: She is alert.   Psychiatric:         Speech: Speech normal.             PERTINENT RESULTS:   BMP  Lab Results   Component Value Date     2023    K 4.4 2023     2023    CO2 26 2023    BUN 22 (H) 2023    CREATININE 0.69 2023    CALCIUM 8.6 (L) 2023    ANIONGAP 8 2023    EGFRNORACEVR >60.0 2023       ASSESSMENT/PLAN:    1. Gastroesophageal reflux disease without esophagitis  Overview:  - laryngeal reflux evaluated by Otolaryngology  - reviewed ENT note 2020.  Reviewed my note 2021 which noted that she did improve on pantoprazole 40 mg daily and had discontinued the medication after improvement   - cough improved since starting pantoprazole 40 mg daily   -continue pantoprazole      2. Pharyngeal dysphagia  Overview:  - resolved since starting pantoprazole 40 mg daily   -discuss if recurs to notify me      3. Adjustment insomnia  Overview:  - counseling on management of her sleep regimen   -appears her sleep cycle has been disrupted   -recommend avoid caffeine in the afternoon "   -recommend avoid naps   -recommend regular sleep schedule to start at a precise sleep time as well as waking hours   - okay to try melatonin 6 mg at bedtime but discussed that it takes several months for melatonin to assist with sleep cycle regulation and that she also has to do the work to recycle her sleep cycle   -she does have a appointment with sleep medicine to discuss snoring and concerns for sleep apnea  - stable            ORDERS:          Vaccines recommended:  Flu and COVID-19.  She declined    Follow-up in 8-9 months annual with labs or sooner if any concerns.      This note is dictated using the M*Modal Fluency Direct word recognition program. There are word recognition mistakes that are occasionally missed on review.    Dr. Kasandra Palacios D.O.   [facility]

## 2023-11-22 ENCOUNTER — OFFICE VISIT (OUTPATIENT)
Dept: FAMILY MEDICINE | Facility: CLINIC | Age: 73
End: 2023-11-22
Payer: MEDICARE

## 2023-11-22 ENCOUNTER — OFFICE VISIT (OUTPATIENT)
Dept: PRIMARY CARE CLINIC | Facility: CLINIC | Age: 73
End: 2023-11-22
Attending: FAMILY MEDICINE
Payer: MEDICARE

## 2023-11-22 VITALS
WEIGHT: 136.69 LBS | DIASTOLIC BLOOD PRESSURE: 73 MMHG | TEMPERATURE: 98 F | BODY MASS INDEX: 22.77 KG/M2 | OXYGEN SATURATION: 97 % | HEART RATE: 71 BPM | SYSTOLIC BLOOD PRESSURE: 118 MMHG | HEIGHT: 65 IN

## 2023-11-22 VITALS — HEIGHT: 65 IN | BODY MASS INDEX: 22.49 KG/M2 | WEIGHT: 135 LBS

## 2023-11-22 DIAGNOSIS — R13.13 PHARYNGEAL DYSPHAGIA: ICD-10-CM

## 2023-11-22 DIAGNOSIS — K21.9 GASTROESOPHAGEAL REFLUX DISEASE WITHOUT ESOPHAGITIS: Primary | ICD-10-CM

## 2023-11-22 DIAGNOSIS — M81.0 AGE-RELATED OSTEOPOROSIS WITHOUT CURRENT PATHOLOGICAL FRACTURE: ICD-10-CM

## 2023-11-22 DIAGNOSIS — K21.9 GASTROESOPHAGEAL REFLUX DISEASE WITHOUT ESOPHAGITIS: ICD-10-CM

## 2023-11-22 DIAGNOSIS — F51.02 ADJUSTMENT INSOMNIA: ICD-10-CM

## 2023-11-22 DIAGNOSIS — Z00.00 ENCOUNTER FOR MEDICARE ANNUAL WELLNESS EXAM: Primary | ICD-10-CM

## 2023-11-22 PROCEDURE — 1159F PR MEDICATION LIST DOCUMENTED IN MEDICAL RECORD: ICD-10-PCS | Mod: CPTII,S$GLB,, | Performed by: NURSE PRACTITIONER

## 2023-11-22 PROCEDURE — 3288F FALL RISK ASSESSMENT DOCD: CPT | Mod: CPTII,S$GLB,, | Performed by: NURSE PRACTITIONER

## 2023-11-22 PROCEDURE — 1101F PR PT FALLS ASSESS DOC 0-1 FALLS W/OUT INJ PAST YR: ICD-10-PCS | Mod: CPTII,95,, | Performed by: FAMILY MEDICINE

## 2023-11-22 PROCEDURE — 3288F FALL RISK ASSESSMENT DOCD: CPT | Mod: CPTII,95,, | Performed by: FAMILY MEDICINE

## 2023-11-22 PROCEDURE — 3078F DIAST BP <80 MM HG: CPT | Mod: CPTII,S$GLB,, | Performed by: NURSE PRACTITIONER

## 2023-11-22 PROCEDURE — 1157F PR ADVANCE CARE PLAN OR EQUIV PRESENT IN MEDICAL RECORD: ICD-10-PCS | Mod: CPTII,95,, | Performed by: FAMILY MEDICINE

## 2023-11-22 PROCEDURE — 1159F PR MEDICATION LIST DOCUMENTED IN MEDICAL RECORD: ICD-10-PCS | Mod: CPTII,95,, | Performed by: FAMILY MEDICINE

## 2023-11-22 PROCEDURE — 1126F PR PAIN SEVERITY QUANTIFIED, NO PAIN PRESENT: ICD-10-PCS | Mod: CPTII,95,, | Performed by: FAMILY MEDICINE

## 2023-11-22 PROCEDURE — 3008F PR BODY MASS INDEX (BMI) DOCUMENTED: ICD-10-PCS | Mod: CPTII,95,, | Performed by: FAMILY MEDICINE

## 2023-11-22 PROCEDURE — G0439 PPPS, SUBSEQ VISIT: HCPCS | Mod: S$GLB,,, | Performed by: NURSE PRACTITIONER

## 2023-11-22 PROCEDURE — 1101F PR PT FALLS ASSESS DOC 0-1 FALLS W/OUT INJ PAST YR: ICD-10-PCS | Mod: CPTII,S$GLB,, | Performed by: NURSE PRACTITIONER

## 2023-11-22 PROCEDURE — 3078F PR MOST RECENT DIASTOLIC BLOOD PRESSURE < 80 MM HG: ICD-10-PCS | Mod: CPTII,S$GLB,, | Performed by: NURSE PRACTITIONER

## 2023-11-22 PROCEDURE — 99214 PR OFFICE/OUTPT VISIT, EST, LEVL IV, 30-39 MIN: ICD-10-PCS | Mod: 95,,, | Performed by: FAMILY MEDICINE

## 2023-11-22 PROCEDURE — 99999 PR PBB SHADOW E&M-EST. PATIENT-LVL IV: CPT | Mod: PBBFAC,,, | Performed by: NURSE PRACTITIONER

## 2023-11-22 PROCEDURE — 3008F BODY MASS INDEX DOCD: CPT | Mod: CPTII,95,, | Performed by: FAMILY MEDICINE

## 2023-11-22 PROCEDURE — 3074F PR MOST RECENT SYSTOLIC BLOOD PRESSURE < 130 MM HG: ICD-10-PCS | Mod: CPTII,S$GLB,, | Performed by: NURSE PRACTITIONER

## 2023-11-22 PROCEDURE — 1170F FXNL STATUS ASSESSED: CPT | Mod: CPTII,S$GLB,, | Performed by: NURSE PRACTITIONER

## 2023-11-22 PROCEDURE — 3288F PR FALLS RISK ASSESSMENT DOCUMENTED: ICD-10-PCS | Mod: CPTII,95,, | Performed by: FAMILY MEDICINE

## 2023-11-22 PROCEDURE — 1101F PT FALLS ASSESS-DOCD LE1/YR: CPT | Mod: CPTII,S$GLB,, | Performed by: NURSE PRACTITIONER

## 2023-11-22 PROCEDURE — 1126F AMNT PAIN NOTED NONE PRSNT: CPT | Mod: CPTII,S$GLB,, | Performed by: NURSE PRACTITIONER

## 2023-11-22 PROCEDURE — 1160F PR REVIEW ALL MEDS BY PRESCRIBER/CLIN PHARMACIST DOCUMENTED: ICD-10-PCS | Mod: CPTII,S$GLB,, | Performed by: NURSE PRACTITIONER

## 2023-11-22 PROCEDURE — 1157F ADVNC CARE PLAN IN RCRD: CPT | Mod: CPTII,95,, | Performed by: FAMILY MEDICINE

## 2023-11-22 PROCEDURE — 1157F PR ADVANCE CARE PLAN OR EQUIV PRESENT IN MEDICAL RECORD: ICD-10-PCS | Mod: CPTII,S$GLB,, | Performed by: NURSE PRACTITIONER

## 2023-11-22 PROCEDURE — 99999 PR PBB SHADOW E&M-EST. PATIENT-LVL IV: ICD-10-PCS | Mod: PBBFAC,,, | Performed by: NURSE PRACTITIONER

## 2023-11-22 PROCEDURE — 99214 OFFICE O/P EST MOD 30 MIN: CPT | Mod: 95,,, | Performed by: FAMILY MEDICINE

## 2023-11-22 PROCEDURE — 1126F AMNT PAIN NOTED NONE PRSNT: CPT | Mod: CPTII,95,, | Performed by: FAMILY MEDICINE

## 2023-11-22 PROCEDURE — 1170F PR FUNCTIONAL STATUS ASSESSED: ICD-10-PCS | Mod: CPTII,S$GLB,, | Performed by: NURSE PRACTITIONER

## 2023-11-22 PROCEDURE — 1159F MED LIST DOCD IN RCRD: CPT | Mod: CPTII,S$GLB,, | Performed by: NURSE PRACTITIONER

## 2023-11-22 PROCEDURE — 3074F SYST BP LT 130 MM HG: CPT | Mod: CPTII,S$GLB,, | Performed by: NURSE PRACTITIONER

## 2023-11-22 PROCEDURE — 3288F PR FALLS RISK ASSESSMENT DOCUMENTED: ICD-10-PCS | Mod: CPTII,S$GLB,, | Performed by: NURSE PRACTITIONER

## 2023-11-22 PROCEDURE — 1160F RVW MEDS BY RX/DR IN RCRD: CPT | Mod: CPTII,S$GLB,, | Performed by: NURSE PRACTITIONER

## 2023-11-22 PROCEDURE — 1126F PR PAIN SEVERITY QUANTIFIED, NO PAIN PRESENT: ICD-10-PCS | Mod: CPTII,S$GLB,, | Performed by: NURSE PRACTITIONER

## 2023-11-22 PROCEDURE — 1101F PT FALLS ASSESS-DOCD LE1/YR: CPT | Mod: CPTII,95,, | Performed by: FAMILY MEDICINE

## 2023-11-22 PROCEDURE — G0439 PR MEDICARE ANNUAL WELLNESS SUBSEQUENT VISIT: ICD-10-PCS | Mod: S$GLB,,, | Performed by: NURSE PRACTITIONER

## 2023-11-22 PROCEDURE — 1157F ADVNC CARE PLAN IN RCRD: CPT | Mod: CPTII,S$GLB,, | Performed by: NURSE PRACTITIONER

## 2023-11-22 PROCEDURE — 1159F MED LIST DOCD IN RCRD: CPT | Mod: CPTII,95,, | Performed by: FAMILY MEDICINE

## 2023-11-22 NOTE — PROGRESS NOTES
"  Belen Salazar presented for a  Medicare AWV and comprehensive Health Risk Assessment today. The following components were reviewed and updated:    Medical history  Family History  Social history  Allergies and Current Medications  Health Risk Assessment  Health Maintenance  Care Team         ** See Completed Assessments for Annual Wellness Visit within the encounter summary.**         The following assessments were completed:  Living Situation  CAGE  Depression Screening  Timed Get Up and Go  Whisper Test  Cognitive Function Screening  Nutrition Screening  ADL Screening  PAQ Screening        Vitals:    11/22/23 1028   BP: 118/73   BP Location: Left arm   Patient Position: Sitting   BP Method: Medium (Automatic)   Pulse: 71   Temp: 97.8 °F (36.6 °C)   TempSrc: Oral   SpO2: 97%   Weight: 62 kg (136 lb 11 oz)   Height: 5' 5" (1.651 m)     Body mass index is 22.75 kg/m².  Physical Exam  Constitutional:       Appearance: She is well-developed.   HENT:      Head: Normocephalic.      Right Ear: Tympanic membrane and ear canal normal.      Left Ear: Tympanic membrane and ear canal normal.      Ears:      Comments: Bilateral hearing aids  Eyes:      Conjunctiva/sclera: Conjunctivae normal.      Pupils: Pupils are equal, round, and reactive to light.   Cardiovascular:      Rate and Rhythm: Normal rate and regular rhythm.      Heart sounds: Normal heart sounds.   Pulmonary:      Effort: Pulmonary effort is normal.      Breath sounds: Normal breath sounds.   Abdominal:      General: Bowel sounds are normal.      Palpations: Abdomen is soft.   Musculoskeletal:         General: No swelling. Normal range of motion.      Cervical back: Normal range of motion and neck supple.   Skin:     General: Skin is warm and dry.   Neurological:      Mental Status: She is alert and oriented to person, place, and time.   Psychiatric:         Mood and Affect: Mood normal.         Behavior: Behavior normal.               Diagnoses and health " risks identified today and associated recommendations/orders:    1. Encounter for Medicare annual wellness exam  - Health maintenance reviewed. Patient declined mammogram and influenza vaccine  - Ambulatory Referral/Consult to Enhanced Annual Wellness Visit (eAWV)    2. Age-related osteoporosis without current pathological fracture  - Followed by PCP    3. Gastroesophageal reflux disease without esophagitis  - Improving with daily PPI  - Followed by PCP        Provided Belen with a 5-10 year written screening schedule and personal prevention plan. Recommendations were developed using the USPSTF age appropriate recommendations. Education, counseling, and referrals were provided as needed. After Visit Summary printed and given to patient which includes a list of additional screenings\tests needed.    Follow up for PCP visit as scheduled and Annual Medicare Wellness in 1 year.    TEO Painter    I offered to discuss advanced care planning, including how to pick a person who would make decisions for you if you were unable to make them for yourself, called a health care power of , and what kind of decisions you might make such as use of life sustaining treatments such as ventilators and tube feeding when faced with a life limiting illness recorded on a living will that they will need to know. (How you want to be cared for as you near the end of your natural life)     X  Patient is unwilling to engage in a discussion regarding advance directives at this time.

## 2023-11-22 NOTE — PATIENT INSTRUCTIONS
Counseling and Referral of Other Preventative  (Italic type indicates deductible and co-insurance are waived)    Patient Name: Belen Salazar  Today's Date: 11/22/2023    Health Maintenance       Date Due Completion Date    RSV Vaccine (Age 60+ and Pregnant patients) (1 - 1-dose 60+ series) Never done ---    Mammogram 03/22/2023 3/22/2021    Influenza Vaccine (1) 09/01/2023 10/17/2020    Override on 10/1/2019: Done    COVID-19 Vaccine (4 - 2023-24 season) 09/01/2023 7/26/2022    Colorectal Cancer Screening 08/27/2024 8/27/2023    DEXA Scan 08/17/2026 8/17/2023    Lipid Panel 03/02/2027 3/2/2022    TETANUS VACCINE 03/09/2030 3/9/2020        No orders of the defined types were placed in this encounter.    The following information is provided to all patients.  This information is to help you find resources for any of the problems found today that may be affecting your health:                Living healthy guide: www.[facility].[place].gov      Understanding Diabetes: www.diabetes.org      Eating healthy: www.cdc.gov/healthyweight      CDC home safety checklist: www.cdc.gov/steadi/patient.html      Agency on Aging: www.goea.[place].gov      Alcoholics anonymous (AA): www.aa.org      Physical Activity: www.[place].nih.gov/im3mmhd      Tobacco use: www.quitwithusla.org

## 2023-12-29 DIAGNOSIS — K21.9 GASTROESOPHAGEAL REFLUX DISEASE WITHOUT ESOPHAGITIS: ICD-10-CM

## 2023-12-29 RX ORDER — PANTOPRAZOLE SODIUM 40 MG/1
40 TABLET, DELAYED RELEASE ORAL DAILY
Qty: 90 TABLET | Refills: 0 | Status: SHIPPED | OUTPATIENT
Start: 2023-12-29 | End: 2024-03-28

## 2023-12-29 NOTE — TELEPHONE ENCOUNTER
Refill Encounter    PCP Visits: Recent Visits  Date Type Provider Dept   11/22/23 Office Visit Kasandra Palacios, DO [facility]   10/11/23 Office Visit Kasandra Palacios, DO [facility]   09/14/23 Office Visit Kasandra Palacios, DO [facility]   03/14/23 Office Visit Kasandra Palacios, DO [facility]   01/30/23 Office Visit Kasandra Palacios, DO [facility]   Showing recent visits within past 360 days and meeting all other requirements  Future Appointments  No visits were found meeting these conditions.  Showing future appointments within next 720 days and meeting all other requirements     Last 3 Blood Pressure:   BP Readings from Last 3 Encounters:   11/22/23 118/73   10/11/23 110/68   03/14/23 122/76     Preferred Pharmacy:   [id number]  [place] - [id number]  [id number]  [place] [address]  Phone: [phone] Fax: [phone]    Requested RX:  Requested Prescriptions     Pending Prescriptions Disp Refills    pantoprazole (PROTONIX) 40 MG tablet 90 tablet 0     Sig: Take 1 tablet (40 mg total) by mouth once daily.      RX Route: Normal

## 2023-12-29 NOTE — TELEPHONE ENCOUNTER
No care due was identified.  [facility] Embedded Care Due Messages. Reference number: [id number].   12/29/2023 8:04:31 AM CST

## 2024-01-05 ENCOUNTER — PATIENT MESSAGE (OUTPATIENT)
Dept: PRIMARY CARE CLINIC | Facility: CLINIC | Age: 74
End: 2024-01-05
Payer: MEDICARE

## 2024-01-19 ENCOUNTER — OFFICE VISIT (OUTPATIENT)
Dept: SLEEP MEDICINE | Facility: CLINIC | Age: 74
End: 2024-01-19
Payer: MEDICARE

## 2024-01-19 VITALS
WEIGHT: 138.38 LBS | HEIGHT: 65 IN | DIASTOLIC BLOOD PRESSURE: 80 MMHG | BODY MASS INDEX: 23.06 KG/M2 | SYSTOLIC BLOOD PRESSURE: 120 MMHG | HEART RATE: 67 BPM

## 2024-01-19 DIAGNOSIS — R06.83 SNORING: ICD-10-CM

## 2024-01-19 DIAGNOSIS — R06.81 WITNESSED EPISODE OF APNEA: ICD-10-CM

## 2024-01-19 DIAGNOSIS — G47.30 SLEEP APNEA, UNSPECIFIED TYPE: Primary | ICD-10-CM

## 2024-01-19 PROCEDURE — 1126F AMNT PAIN NOTED NONE PRSNT: CPT | Mod: CPTII,S$GLB,, | Performed by: NURSE PRACTITIONER

## 2024-01-19 PROCEDURE — 99999 PR PBB SHADOW E&M-EST. PATIENT-LVL III: CPT | Mod: PBBFAC,,, | Performed by: NURSE PRACTITIONER

## 2024-01-19 PROCEDURE — 3079F DIAST BP 80-89 MM HG: CPT | Mod: CPTII,S$GLB,, | Performed by: NURSE PRACTITIONER

## 2024-01-19 PROCEDURE — 1101F PT FALLS ASSESS-DOCD LE1/YR: CPT | Mod: CPTII,S$GLB,, | Performed by: NURSE PRACTITIONER

## 2024-01-19 PROCEDURE — 99203 OFFICE O/P NEW LOW 30 MIN: CPT | Mod: S$GLB,,, | Performed by: NURSE PRACTITIONER

## 2024-01-19 PROCEDURE — 3008F BODY MASS INDEX DOCD: CPT | Mod: CPTII,S$GLB,, | Performed by: NURSE PRACTITIONER

## 2024-01-19 PROCEDURE — 3074F SYST BP LT 130 MM HG: CPT | Mod: CPTII,S$GLB,, | Performed by: NURSE PRACTITIONER

## 2024-01-19 PROCEDURE — 3288F FALL RISK ASSESSMENT DOCD: CPT | Mod: CPTII,S$GLB,, | Performed by: NURSE PRACTITIONER

## 2024-01-19 PROCEDURE — 1157F ADVNC CARE PLAN IN RCRD: CPT | Mod: CPTII,S$GLB,, | Performed by: NURSE PRACTITIONER

## 2024-01-19 NOTE — PROGRESS NOTES
"Referred by Dr. Palacios    CHIEF COMPLAINT: Sleep evaluation    HISTORY OF PRESENT ILLNESS:She has never had a sleep study done.  had previously reported that she stopped breathing while asleep but his testimony was not trustworthy (PD). Sleep study was cost prohibitive at that time. She has mild occasional snoring and denies air gasping or choking during sleep. Denies am headaches or sinus congestion. Side sleeper. +disrupted sleep most nights. Feels refreshed in am but by afternoon may at times take 15min nap. She stays active/fit. Mouth occasionally dry and watch devika indicates O2 in 80's at times.     Denies symptoms of restless legs  On todays [place] Sleepiness Scale the patient scores a 8/24.       FAMILY HISTORY: 1 brother CAROL  SOCIAL HISTORY: , retired    /80 (BP Location: Left arm, Patient Position: Sitting, BP Method: Large (Automatic))   Pulse 67   Ht 5' 5" (1.651 m)   Wt 62.8 kg (138 lb 6.4 oz)   BMI 23.03 kg/m²     ASSESSMENT:   Unspecified Sleep Apnea, with symptoms of occasional mild snoring, disrupted sleep and mild daytime sleepiness,  with medical comorbidities of GERD. Warrants further investigation for untreated sleep apnea.     PLAN:   1.  Home Sleep Study, discussed plan of care , defer psg   2. Discussed etiology of CAROL and potential ramifications of untreated CAROL, including  heart disease, HTN.  We discussed potential treatment options, which could include continuous positive airway pressure (CPAP-definitive), or mandibular advancement splint by dentist, or referral for surgical consideration.   Continue side sleep    Thank you for allowing me the opportunity to participate in the care of your patient        "

## 2024-01-31 ENCOUNTER — TELEPHONE (OUTPATIENT)
Dept: SLEEP MEDICINE | Facility: OTHER | Age: 74
End: 2024-01-31
Payer: MEDICARE

## 2024-02-22 ENCOUNTER — HOSPITAL ENCOUNTER (OUTPATIENT)
Dept: SLEEP MEDICINE | Facility: HOSPITAL | Age: 74
Discharge: HOME | End: 2024-02-22
Attending: NURSE PRACTITIONER
Payer: MEDICARE

## 2024-02-22 DIAGNOSIS — G47.30 SLEEP APNEA, UNSPECIFIED TYPE: ICD-10-CM

## 2024-02-22 DIAGNOSIS — G47.33 OSA (OBSTRUCTIVE SLEEP APNEA): Primary | ICD-10-CM

## 2024-02-22 PROCEDURE — 95800 SLP STDY UNATTENDED: CPT

## 2024-03-03 PROCEDURE — 95806 SLEEP STUDY UNATT&RESP EFFT: CPT | Mod: 26,,, | Performed by: PSYCHIATRY & NEUROLOGY

## 2024-03-05 ENCOUNTER — PATIENT MESSAGE (OUTPATIENT)
Dept: SLEEP MEDICINE | Facility: CLINIC | Age: 74
End: 2024-03-05
Payer: MEDICARE

## 2024-03-05 DIAGNOSIS — G47.33 OSA (OBSTRUCTIVE SLEEP APNEA): Primary | ICD-10-CM

## 2024-03-05 PROBLEM — G47.30 SLEEP APNEA: Status: ACTIVE | Noted: 2023-09-14

## 2024-03-06 ENCOUNTER — PATIENT MESSAGE (OUTPATIENT)
Dept: PRIMARY CARE CLINIC | Facility: CLINIC | Age: 74
End: 2024-03-06
Payer: MEDICARE

## 2024-04-05 DIAGNOSIS — K21.9 GASTROESOPHAGEAL REFLUX DISEASE WITHOUT ESOPHAGITIS: ICD-10-CM

## 2024-04-05 RX ORDER — PANTOPRAZOLE SODIUM 40 MG/1
40 TABLET, DELAYED RELEASE ORAL
Qty: 90 TABLET | Refills: 0 | Status: SHIPPED | OUTPATIENT
Start: 2024-04-05

## 2024-07-11 DIAGNOSIS — K21.9 GASTROESOPHAGEAL REFLUX DISEASE WITHOUT ESOPHAGITIS: ICD-10-CM

## 2024-07-11 RX ORDER — PANTOPRAZOLE SODIUM 40 MG/1
40 TABLET, DELAYED RELEASE ORAL
Qty: 90 TABLET | Refills: 1 | Status: SHIPPED | OUTPATIENT
Start: 2024-07-11

## 2024-07-11 NOTE — TELEPHONE ENCOUNTER
No care due was identified.  [facility] Embedded Care Due Messages. Reference number: [id number].   7/11/2024 6:44:14 AM CDT

## 2024-07-11 NOTE — TELEPHONE ENCOUNTER
Refill Decision Note   Belen Martin  is requesting a refill authorization.  Brief Assessment and Rationale for Refill:  Approve     Medication Therapy Plan:         Comments:     Note composed:10:18 AM 07/11/2024

## 2024-08-08 ENCOUNTER — OFFICE VISIT (OUTPATIENT)
Dept: SLEEP MEDICINE | Facility: CLINIC | Age: 74
End: 2024-08-08
Payer: MEDICARE

## 2024-08-08 VITALS
DIASTOLIC BLOOD PRESSURE: 80 MMHG | HEART RATE: 90 BPM | SYSTOLIC BLOOD PRESSURE: 130 MMHG | BODY MASS INDEX: 22.94 KG/M2 | WEIGHT: 137.69 LBS | HEIGHT: 65 IN

## 2024-08-08 DIAGNOSIS — G47.33 OSA (OBSTRUCTIVE SLEEP APNEA): Primary | ICD-10-CM

## 2024-08-08 PROCEDURE — 99999 PR PBB SHADOW E&M-EST. PATIENT-LVL II: CPT | Mod: PBBFAC,,, | Performed by: NURSE PRACTITIONER

## 2024-08-08 PROCEDURE — 3288F FALL RISK ASSESSMENT DOCD: CPT | Mod: CPTII,S$GLB,, | Performed by: NURSE PRACTITIONER

## 2024-08-08 PROCEDURE — 1101F PT FALLS ASSESS-DOCD LE1/YR: CPT | Mod: CPTII,S$GLB,, | Performed by: NURSE PRACTITIONER

## 2024-08-08 PROCEDURE — 3079F DIAST BP 80-89 MM HG: CPT | Mod: CPTII,S$GLB,, | Performed by: NURSE PRACTITIONER

## 2024-08-08 PROCEDURE — 99214 OFFICE O/P EST MOD 30 MIN: CPT | Mod: S$GLB,,, | Performed by: NURSE PRACTITIONER

## 2024-08-08 PROCEDURE — 3075F SYST BP GE 130 - 139MM HG: CPT | Mod: CPTII,S$GLB,, | Performed by: NURSE PRACTITIONER

## 2024-08-08 PROCEDURE — 1157F ADVNC CARE PLAN IN RCRD: CPT | Mod: CPTII,S$GLB,, | Performed by: NURSE PRACTITIONER

## 2024-08-08 PROCEDURE — 3008F BODY MASS INDEX DOCD: CPT | Mod: CPTII,S$GLB,, | Performed by: NURSE PRACTITIONER

## 2024-10-08 ENCOUNTER — PATIENT OUTREACH (OUTPATIENT)
Dept: ADMINISTRATIVE | Facility: HOSPITAL | Age: 74
End: 2024-10-08
Payer: MEDICARE

## 2024-10-08 DIAGNOSIS — Z12.11 SCREENING FOR COLORECTAL CANCER: Primary | ICD-10-CM

## 2024-10-08 DIAGNOSIS — Z12.12 SCREENING FOR COLORECTAL CANCER: Primary | ICD-10-CM

## 2024-10-09 ENCOUNTER — PATIENT OUTREACH (OUTPATIENT)
Dept: ADMINISTRATIVE | Facility: HOSPITAL | Age: 74
End: 2024-10-09
Payer: MEDICARE

## 2024-12-05 ENCOUNTER — LAB VISIT (OUTPATIENT)
Dept: LAB | Facility: HOSPITAL | Age: 74
End: 2024-12-05
Attending: FAMILY MEDICINE
Payer: MEDICARE

## 2024-12-05 DIAGNOSIS — Z12.12 SCREENING FOR COLORECTAL CANCER: ICD-10-CM

## 2024-12-05 DIAGNOSIS — Z12.11 SCREENING FOR COLORECTAL CANCER: ICD-10-CM

## 2024-12-05 LAB — HEMOCCULT STL QL IA: NEGATIVE

## 2024-12-05 PROCEDURE — 82274 ASSAY TEST FOR BLOOD FECAL: CPT | Performed by: FAMILY MEDICINE

## 2024-12-19 NOTE — PROGRESS NOTES
FAMILY MEDICINE  [facility]    Reason for visit:   Chief Complaint   Patient presents with    Hip Pain     Foot Pain      Overactive bladder    Palpitations         SUBJECTIVE: Belen Salazar is a 74 y.o. female  - with CAROL, thyroid nodules and osteoporosis presents concerns for hip pain, foot pain overactive bladder and high heart rate during sleep    ENT: Dr. Downs  Dermatology Dr. Mckeon  Ophthalmology: Dvaid Pugh  Sleep Medicine: Shannan Green, NP         Belen presents with complaints of urinary incontinence during exercise, foot pain, hip pain, and occasional burning sensation on top of the left foot.      Belen has urinary incontinence specifically while walking at the gym. She exercises daily on the track and notices a lack of bladder control, with small amounts of urine leakage with each step. She also has incontinence with sneezing or coughing. To mitigate this issue, she has stopped caffeine consumption in the morning before walking. The problem only occurs during exercise and not at other times during the day or at night.  She reports other times during the day she has no problems holding her urine.  She denies any urgency or frequency other than with walking.  She is able to do other exercises without any issues.  She is able to walk during the day without any issues in his seems to only affect her when she is walkingon the gym track.  She denies any fevers, chills, flank pain, dysuria hematuria    Belen reports sharp left hip pain while walking fast, occurring approximately once a week and lasting about 3-4 minutes.  She reports it resolves spontaneously.  It only happens when she is exercise walking.  She reports that she does not take anything for it.  She denies any swelling or weakness.  She denies any back pain.  She does not stretch regularly.  She tends regular exercise classes without any issues    An intermittent burning or tingling  sensation occurs on the top of her left foot. She has been using an OTC topical treatment for this issue.  She reports since she started a topical over-the-counter foot cream this symptom has resolved.  She denies any swelling, weakness persistent pain    Belen has a varicose vein, which is a cosmetic concern.  She reports it is nonpainful.  She denies any swelling her right lower extremity with the varicose veins seems to cause an issue.  She denies any ulcerations.  She reports varicose vein he is on her right posterior    For sleep, the patient uses a CPAP machine nightly, except when sitting in her chair.  Her watch shows varying heart rates at night, sometimes with spikes.  Heart rate maximum at night is 103 beats per minute.  Her average heart rate of 72 beats per minute on her fitness devika. her ranges 52 to 103 beats per minute.    Belen engages in regular exercise, including walking 6 miles a day, [place], and various cardio classes. She is also incorporating more weight training.    Belen denies hematuria, nocturia, or difficulty urinating when socializing. She denies any concerning symptoms related to her varying heart rate at night.            Review of Systems   HENT:  Negative for hearing loss.    Eyes:  Negative for discharge.   Respiratory:  Negative for wheezing.    Cardiovascular:  Negative for chest pain and palpitations.   Gastrointestinal:  Negative for blood in stool, constipation, diarrhea and vomiting.   Genitourinary:  Negative for dysuria and hematuria.   Musculoskeletal:  Negative for neck pain.   Neurological:  Negative for weakness and headaches.   Endo/Heme/Allergies:  Negative for polydipsia.   All other systems reviewed and are negative.      HEALTH MAINTENANCE:   Health Maintenance   Topic Date Due    RSV Vaccine (Age 60+ and Pregnant patients) (1 - Risk 60-74 years 1-dose series) Never done    Influenza Vaccine (1) 09/01/2024    COVID-19 Vaccine (4 - 2024-25 season) 09/01/2024     Colorectal Cancer Screening  12/05/2025    DEXA Scan  08/17/2026    Mammogram  12/11/2026    Lipid Panel  03/02/2027    TETANUS VACCINE  03/09/2030    Hepatitis C Screening  Completed    Shingles Vaccine  Completed    Pneumococcal Vaccines (Age 50+)  Completed     Health Maintenance Topics with due status: Not Due       Topic Last Completion Date    TETANUS VACCINE 03/09/2020    Lipid Panel 03/02/2022    DEXA Scan 08/17/2023    Colorectal Cancer Screening 12/05/2024    Mammogram 12/11/2024     Health Maintenance Due   Topic Date Due    RSV Vaccine (Age 60+ and Pregnant patients) (1 - Risk 60-74 years 1-dose series) Never done    Influenza Vaccine (1) 09/01/2024    COVID-19 Vaccine (4 - 2024-25 season) 09/01/2024       HISTORY:   Past Medical History:   Diagnosis Date    Cataract     bilateral, removed 2012    Depression 3/6/2015    Hypotension, unspecified     Miscarriage     3 in the early 1980s    Osteoporosis     diagnosed 2006       Past Surgical History:   Procedure Laterality Date    DILATION AND CURETTAGE OF UTERUS      1 or 2 following miscarriage(s)    EYE SURGERY      cataract removal 2012    TONSILLECTOMY         Family History   Problem Relation Name Age of Onset    COPD Father Daddy     Cancer Maternal Uncle Uncle Wise     Cancer Maternal Grandmother Grandma Marli     Heart disease Maternal Grandfather Grandpa Pie     Diabetes Paternal Grandmother Mamee     Hypertension Paternal Grandmother Mamee     Heart disease Paternal Grandfather [place]     Stroke Paternal Grandfather [place]     Hypertension Mother Mom     COPD Mother Mom     Neuropathy Brother      No Known Problems Sister      Scoliosis Sister      No Known Problems Brother      No Known Problems Brother      No Known Problems Brother      No Known Problems Brother         Social History     Tobacco Use    Smoking status: Former     Current packs/day: 0.75     Average packs/day: 0.8 packs/day for 30.0 years (22.5 ttl pk-yrs)     Types:  "Cigarettes     Passive exposure: Never    Smokeless tobacco: Never    Tobacco comments:     2013   Substance Use Topics    Alcohol use: Yes     Alcohol/week: 14.0 standard drinks of alcohol     Types: 14 Glasses of wine per week    Drug use: No       Social History     Social History Narrative    Lives in [place] with her adopted daughter and grandson. 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 She is an ex- cigarette smoker.   Parkinsons disease in 2017.        ALLERGIES:   Review of patient's allergies indicates:  No Known Allergies    MEDS:   Current Outpatient Medications on File Prior to Visit   Medication Sig Dispense Refill Last Dose/Taking    pantoprazole (PROTONIX) 40 MG tablet Take 1 tablet by mouth once daily 90 tablet 1 Taking    RESTASIS 0.05 % ophthalmic emulsion Place 1 drop into both eyes 2 (two) times daily.   Taking         Vital signs:   Vitals:    24 1007   BP: 132/80   Patient Position: Sitting   Pulse: 62   SpO2: 100%   Weight: 63.7 kg (140 lb 6.9 oz)   Height: 5' 5" (1.651 m)     Body mass index is 23.37 kg/m².    PHYSICAL EXAM:     Physical Exam  Vitals reviewed.   Constitutional:       General: She is not in acute distress.  HENT:      Head: Normocephalic and atraumatic.      Right Ear: Tympanic membrane and ear canal normal.      Left Ear: Tympanic membrane and ear canal normal.   Eyes:      General: No scleral icterus.     Conjunctiva/sclera: Conjunctivae normal.   Neck:      Thyroid: No thyromegaly.      Vascular: No carotid bruit.   Cardiovascular:      Rate and Rhythm: Normal rate and regular rhythm.      Heart sounds: Normal heart sounds. No murmur heard.     No friction rub. No gallop.   Pulmonary:      Effort: Pulmonary effort is normal.      Breath sounds: Normal breath sounds. No wheezing or rales.   Abdominal:      General: Bowel sounds are normal. There is no distension.      Palpations: Abdomen is soft.      Tenderness: There is " no abdominal tenderness.   Musculoskeletal:      Cervical back: Normal range of motion and neck supple.      Right hip: No deformity, tenderness, bony tenderness or crepitus. Normal range of motion. Normal strength.      Left hip: No deformity, tenderness, bony tenderness or crepitus. Normal range of motion. Normal strength.      Right lower leg: No edema.      Left lower leg: No edema.      Right foot: Normal.      Left foot: Normal.   Lymphadenopathy:      Cervical: No cervical adenopathy.   Skin:     General: Skin is warm.      Capillary Refill: Capillary refill takes less than 2 seconds.   Neurological:      Mental Status: She is alert.             PERTINENT RESULTS:   No visits with results within 1 Week(s) from this visit.   Latest known visit with results is:   Lab Visit on 12/05/2024   Component Date Value Ref Range Status    Fecal Immunochemical Test (iFOBT) 12/05/2024 Negative  Negative Final     Lab Results   Component Value Date    WBC 5.34 08/22/2023    HGB 13.8 08/22/2023    HCT 41.2 08/22/2023    MCV 95 08/22/2023     08/22/2023       CMP  Sodium   Date Value Ref Range Status   08/22/2023 136 136 - 145 mmol/L Final     Potassium   Date Value Ref Range Status   08/22/2023 4.4 3.5 - 5.1 mmol/L Final     Chloride   Date Value Ref Range Status   08/22/2023 102 95 - 110 mmol/L Final     CO2   Date Value Ref Range Status   08/22/2023 26 23 - 29 mmol/L Final     Glucose   Date Value Ref Range Status   08/22/2023 98 70 - 110 mg/dL Final     BUN   Date Value Ref Range Status   08/22/2023 22 (H) 7 - 17 mg/dL Final     Creatinine   Date Value Ref Range Status   08/22/2023 0.69 0.50 - 1.40 mg/dL Final     Calcium   Date Value Ref Range Status   08/22/2023 8.6 (L) 8.7 - 10.5 mg/dL Final     Total Protein   Date Value Ref Range Status   08/22/2023 7.3 6.0 - 8.4 g/dL Final     Albumin   Date Value Ref Range Status   08/22/2023 4.2 3.5 - 5.2 g/dL Final     Total Bilirubin   Date Value Ref Range Status  "  10/07/2023 1.6 (H) 0.1 - 1.0 mg/dL Final     Comment:     For infants and newborns, interpretation of results should be based  on gestational age, weight and in agreement with clinical  observations.    Premature Infant recommended reference ranges:  Up to 24 hours.............<8.0 mg/dL  Up to 48 hours............<12.0 mg/dL  3-5 days..................<15.0 mg/dL  6-29 days.................<15.0 mg/dL       Alkaline Phosphatase   Date Value Ref Range Status   08/22/2023 88 38 - 126 U/L Final     AST   Date Value Ref Range Status   08/22/2023 31 15 - 46 U/L Final     ALT   Date Value Ref Range Status   08/22/2023 16 10 - 44 U/L Final     Anion Gap   Date Value Ref Range Status   08/22/2023 8 8 - 16 mmol/L Final     eGFR   Date Value Ref Range Status   08/22/2023 >60.0 >60 mL/min/1.73 m^2 Final     Lab Results   Component Value Date    CHOL 237 (H) 03/02/2022    CHOL 239 (H) 03/04/2021    CHOL 232 (H) 07/24/2019     Lab Results   Component Value Date     (H) 03/02/2022     (H) 03/04/2021     (H) 07/24/2019     Lab Results   Component Value Date    LDLCALC 99.8 03/02/2022    LDLCALC 114.8 03/04/2021    LDLCALC 114.2 07/24/2019     Lab Results   Component Value Date    TRIG 71 03/02/2022    TRIG 56 03/04/2021    TRIG 84 07/24/2019       Lab Results   Component Value Date    CHOLHDL 51.9 (H) 03/02/2022    CHOLHDL 47.3 03/04/2021    CHOLHDL 43.5 07/24/2019     No results found for: "LABA1C", "HGBA1C"  Mammo Digital Screening Bilat w/ Wicho  Narrative: Facility:  [address] [place] [address] [phone]    Name: Belen Salazar    MRN: [id number]    Result:  Mammo Digital Screening Bilat w/ Wicho    History:  Patient is 74 y.o. and is seen for a screening mammogram.    Films Compared:  Compared to: 03/22/2021 Mammo Digital Screening Bilat w/ Wicho     Findings:  This procedure was performed using tomosynthesis.   Computer-aided detection was utilized in " the interpretation of this   examination.    The breasts are heterogeneously dense, which may obscure small masses.   There is no evidence of suspicious masses, microcalcifications or   architectural distortion.  Impression:    No mammographic evidence of malignancy.    BI-RADS Category 1: Negative    Recommendation:  Routine screening mammogram in 1 year is recommended.    Your estimated lifetime risk of breast cancer (to age 85) based on   Tyrer-Cuzick risk assessment model is 2.76%.  According to the American   Cancer Society, patients with a lifetime breast cancer risk of 20% or   higher might benefit from supplemental screening tests, such as screening   breast MRI.    Lenny Teresa MD        ASSESSMENT/PLAN:    1. Tachycardia  Overview:  - with CAROL and only at night  - Reviewed heart rate data from wearable device, ordered Holter monitor to further evaluate nighttime fluctuations    Orders:  -     TSH; Future; Expected date: 12/23/2024  -     Comprehensive Metabolic Panel; Future; Expected date: 12/23/2024  -     CBC Auto Differential; Future; Expected date: 12/23/2024  -     Holter monitor - 48 hour; Future; Expected date: 12/24/2024    2. Overactive bladder  Overview:  - Assessed urinary incontinence during exercise, recommended short-acting oxybutynin for symptom management  - Started oxybutynin, short-acting formulation, to be taken 30-60 minutes before exercise for urinary incontinence management.  - Urine sample collection ordered for analysis prior to starting bladder medication.  - counseling regarding new medication including expected results, potential side effects, and appropriate use.  - questions elicited and answered  - encouraged to patient to notify me of any questions or concerns      Orders:  -     oxybutynin (DITROPAN) 5 MG Tab; Take 1 tablet (5 mg total) by mouth once daily.  Dispense: 90 tablet; Refill: 3  -     Urinalysis, Reflex to Urine Culture Urine, Clean Catch; Future; Expected  date: 12/23/2024    3. Left hip pain  Overview:  - Evaluated hip pain during walking, determined likely muscle/tendon related due to rapid onset and resolution  - Assessed hip strength and balance, found good overall strength but room for improvement in balance  - Explained importance of hip stretches before and after exercising, demonstrated specific stretches.  - Discussed exercises to improve hip stability and balance.  - Belen to perform hip stretches for 5 minutes before and after exercising, holding each stretch for 20-30 seconds.  - Belen to incorporate hip stability exercises into routine, including standing leg lifts and seated leg slides.  - instructed on stretches and exercises      4. Left foot pain  Overview:  - has since resolved      5. Asymptomatic varicose veins of right lower extremity  Overview:  - discussed evaluation for venous insufficiency   -she declined at this time since she is asymptomatic      6. Multiple thyroid nodules  Overview:  - 2/5/2020 Thyroid US: The right and left lobes of the thyroid measure 4.5 x 1.3 x 1.5 cm and 5 x 0.9 x 1.1 cm respectively.  The right lobe has a homogeneous echotexture.  There is a heterogeneous 0.6 x 0.8 x 1.3 cm partially cystic nodule within the isthmus.  There are 3 subcentimeter hypoechoic nodules within the left lobe the largest of which measuring 0.9 cm.  There are no enlarged cervical lymph nodes.   - 9/20/23 US: The right and left lobes of the thyroid measure 4.7 x 1.4 x 1.2 cm and 5.1 x 1.2 x 1.1 cm respectively.  There are 3 subcentimeter thyroid nodules on the left.  The right lobe of the thyroid has a homogeneous echotexture.  There are no enlarged cervical lymph nodes.   - asymptomatic  - repeat thyroid ultrasound 3-5 years  Lab Results   Component Value Date    TSH 1.670 08/22/2023    FREET4 0.97 03/04/2021         Orders:  -     TSH; Future; Expected date: 12/23/2024    7. Serum total bilirubin elevated  Overview:  Lab Results    Component Value Date    BILITOT 1.6 (H) 10/07/2023     - 10/07/2023 direct bili normal  - acute on chronic and appears to have had elevated total bilirubin in the past   -asymptomatic     Orders:  -     Comprehensive Metabolic Panel; Future; Expected date: 12/23/2024    8. Liver cyst  Overview:  - 10/13/23 Liver US: 1.0 cm left lobe hepatic cyst.    Orders:  -     Comprehensive Metabolic Panel; Future; Expected date: 12/23/2024    9. Age-related osteoporosis without current pathological fracture  Overview:  - 8/21/17 Dexa: lumbar and bilateral hip osteoporosis  - 2/11/2020 Dexa: osteoporosis with improved T score  - 8/17/2023:  Osteoporosis left femoral neck.  Osteopenia lumbar spine  - Alendronate 70 mg weekly and she discontinued 2/2022. No side effects and opted not to continue with medical management  - fall prevention  - weight bearing exercises like walking, squats, stair and jogging if able  - over the counter vitamin D3 9335-0987 units daily  - dietary calcium 1200 mg per day with diet or supplements    Orders:  -     Vitamin D 25-Hydroxy; Future; Expected date: 12/23/2024    10. Encounter for lipid screening for cardiovascular disease  -     Lipid Panel; Future; Expected date: 12/23/2024        ORDERS:   Orders Placed This Encounter    TSH    Lipid Panel    Comprehensive Metabolic Panel    CBC Auto Differential    Vitamin D 25-Hydroxy    Urinalysis, Reflex to Urine Culture Urine, Clean Catch    Holter monitor - 48 hour    oxybutynin (DITROPAN) 5 MG Tab       Vaccines recommended:  Influenza, COVID-19 and RSV.  She declined vaccinations    Follow up if symptoms worsen or fail to improve, for Pending results. or sooner with any concerns        This note is dictated using the M*Modal Fluency Direct word recognition program. There are word recognition mistakes that are occasionally missed on review.    Dr. Kasandra Palacios D.O.   [facility]

## 2024-12-23 ENCOUNTER — OFFICE VISIT (OUTPATIENT)
Dept: PRIMARY CARE CLINIC | Facility: CLINIC | Age: 74
End: 2024-12-23
Attending: FAMILY MEDICINE
Payer: MEDICARE

## 2024-12-23 ENCOUNTER — LAB VISIT (OUTPATIENT)
Dept: LAB | Facility: HOSPITAL | Age: 74
End: 2024-12-23
Attending: FAMILY MEDICINE
Payer: MEDICARE

## 2024-12-23 VITALS
WEIGHT: 140.44 LBS | OXYGEN SATURATION: 100 % | SYSTOLIC BLOOD PRESSURE: 132 MMHG | DIASTOLIC BLOOD PRESSURE: 80 MMHG | BODY MASS INDEX: 23.4 KG/M2 | HEART RATE: 62 BPM | HEIGHT: 65 IN

## 2024-12-23 DIAGNOSIS — E04.2 MULTIPLE THYROID NODULES: ICD-10-CM

## 2024-12-23 DIAGNOSIS — M81.0 AGE-RELATED OSTEOPOROSIS WITHOUT CURRENT PATHOLOGICAL FRACTURE: ICD-10-CM

## 2024-12-23 DIAGNOSIS — K76.89 LIVER CYST: ICD-10-CM

## 2024-12-23 DIAGNOSIS — M79.672 LEFT FOOT PAIN: ICD-10-CM

## 2024-12-23 DIAGNOSIS — R00.0 TACHYCARDIA: Primary | ICD-10-CM

## 2024-12-23 DIAGNOSIS — M25.552 LEFT HIP PAIN: ICD-10-CM

## 2024-12-23 DIAGNOSIS — I83.91 ASYMPTOMATIC VARICOSE VEINS OF RIGHT LOWER EXTREMITY: ICD-10-CM

## 2024-12-23 DIAGNOSIS — N32.81 OVERACTIVE BLADDER: ICD-10-CM

## 2024-12-23 DIAGNOSIS — Z13.6 ENCOUNTER FOR LIPID SCREENING FOR CARDIOVASCULAR DISEASE: ICD-10-CM

## 2024-12-23 DIAGNOSIS — R17 SERUM TOTAL BILIRUBIN ELEVATED: ICD-10-CM

## 2024-12-23 DIAGNOSIS — Z13.220 ENCOUNTER FOR LIPID SCREENING FOR CARDIOVASCULAR DISEASE: ICD-10-CM

## 2024-12-23 PROBLEM — G47.30 SLEEP APNEA: Status: RESOLVED | Noted: 2023-09-14 | Resolved: 2024-12-23

## 2024-12-23 LAB
BILIRUB UR QL STRIP: NEGATIVE
CLARITY UR REFRACT.AUTO: ABNORMAL
COLOR UR AUTO: YELLOW
GLUCOSE UR QL STRIP: NEGATIVE
HGB UR QL STRIP: NEGATIVE
KETONES UR QL STRIP: NEGATIVE
LEUKOCYTE ESTERASE UR QL STRIP: NEGATIVE
NITRITE UR QL STRIP: NEGATIVE
PH UR STRIP: 8 [PH] (ref 5–8)
PROT UR QL STRIP: NEGATIVE
SP GR UR STRIP: 1.02 (ref 1–1.03)
URN SPEC COLLECT METH UR: ABNORMAL

## 2024-12-23 PROCEDURE — 1157F ADVNC CARE PLAN IN RCRD: CPT | Mod: CPTII,S$GLB,, | Performed by: FAMILY MEDICINE

## 2024-12-23 PROCEDURE — 81003 URINALYSIS AUTO W/O SCOPE: CPT | Performed by: FAMILY MEDICINE

## 2024-12-23 PROCEDURE — 99999 PR PBB SHADOW E&M-EST. PATIENT-LVL III: CPT | Mod: PBBFAC,,, | Performed by: FAMILY MEDICINE

## 2024-12-23 PROCEDURE — 3075F SYST BP GE 130 - 139MM HG: CPT | Mod: CPTII,S$GLB,, | Performed by: FAMILY MEDICINE

## 2024-12-23 PROCEDURE — 3008F BODY MASS INDEX DOCD: CPT | Mod: CPTII,S$GLB,, | Performed by: FAMILY MEDICINE

## 2024-12-23 PROCEDURE — 99214 OFFICE O/P EST MOD 30 MIN: CPT | Mod: S$GLB,,, | Performed by: FAMILY MEDICINE

## 2024-12-23 PROCEDURE — 1126F AMNT PAIN NOTED NONE PRSNT: CPT | Mod: CPTII,S$GLB,, | Performed by: FAMILY MEDICINE

## 2024-12-23 PROCEDURE — G2211 COMPLEX E/M VISIT ADD ON: HCPCS | Mod: S$GLB,,, | Performed by: FAMILY MEDICINE

## 2024-12-23 PROCEDURE — 3079F DIAST BP 80-89 MM HG: CPT | Mod: CPTII,S$GLB,, | Performed by: FAMILY MEDICINE

## 2024-12-23 PROCEDURE — 1159F MED LIST DOCD IN RCRD: CPT | Mod: CPTII,S$GLB,, | Performed by: FAMILY MEDICINE

## 2024-12-23 PROCEDURE — 1160F RVW MEDS BY RX/DR IN RCRD: CPT | Mod: CPTII,S$GLB,, | Performed by: FAMILY MEDICINE

## 2024-12-23 RX ORDER — OXYBUTYNIN CHLORIDE 5 MG/1
5 TABLET ORAL DAILY
Qty: 90 TABLET | Refills: 3 | Status: SHIPPED | OUTPATIENT
Start: 2024-12-23 | End: 2025-12-23

## 2025-01-02 DIAGNOSIS — K21.9 GASTROESOPHAGEAL REFLUX DISEASE WITHOUT ESOPHAGITIS: ICD-10-CM

## 2025-01-02 RX ORDER — PANTOPRAZOLE SODIUM 40 MG/1
40 TABLET, DELAYED RELEASE ORAL
Qty: 90 TABLET | Refills: 3 | Status: SHIPPED | OUTPATIENT
Start: 2025-01-02

## 2025-01-02 NOTE — TELEPHONE ENCOUNTER
No care due was identified.  [facility] Embedded Care Due Messages. Reference number: [id number].   1/02/2025 9:21:05 AM CST

## 2025-01-03 NOTE — TELEPHONE ENCOUNTER
Refill Decision Note   Belenchris Salazar  is requesting a refill authorization.  Brief Assessment and Rationale for Refill:  Approve     Medication Therapy Plan:         Comments:     Note composed:10:52 PM 01/02/2025

## 2025-01-05 DIAGNOSIS — K21.9 GASTROESOPHAGEAL REFLUX DISEASE WITHOUT ESOPHAGITIS: ICD-10-CM

## 2025-01-05 NOTE — TELEPHONE ENCOUNTER
No care due was identified.  [facility] Embedded Care Due Messages. Reference number: [id number].   1/05/2025 7:59:17 AM CST

## 2025-01-06 RX ORDER — PANTOPRAZOLE SODIUM 40 MG/1
40 TABLET, DELAYED RELEASE ORAL DAILY
Qty: 90 TABLET | Refills: 3 | Status: SHIPPED | OUTPATIENT
Start: 2025-01-06 | End: 2026-01-06

## 2025-01-06 NOTE — TELEPHONE ENCOUNTER
Refill Encounter    PCP Visits: Recent Visits  Date Type Provider Dept   12/23/24 Office Visit Kasandra Palacios DO [facility]   Showing recent visits within past 360 days and meeting all other requirements  Future Appointments  No visits were found meeting these conditions.  Showing future appointments within next 720 days and meeting all other requirements     Last 3 Blood Pressure:   BP Readings from Last 3 Encounters:   12/23/24 132/80   08/08/24 130/80   01/19/24 120/80     Preferred Pharmacy:   [id number] - [place] - [address] [address]  [address] [address]  [place] [id number]  Phone: [phone] Fax: [phone]    Requested RX:  Requested Prescriptions     Pending Prescriptions Disp Refills    pantoprazole (PROTONIX) 40 MG tablet 90 tablet 3     Sig: Take 1 tablet (40 mg total) by mouth.      RX Route: Normal

## 2025-01-09 ENCOUNTER — OFFICE VISIT (OUTPATIENT)
Dept: OPHTHALMOLOGY | Facility: CLINIC | Age: 75
End: 2025-01-09
Payer: MEDICARE

## 2025-01-09 DIAGNOSIS — H04.123 DRY EYE SYNDROME OF BOTH EYES: ICD-10-CM

## 2025-01-09 DIAGNOSIS — H35.342 LAMELLAR MACULAR HOLE, LEFT: ICD-10-CM

## 2025-01-09 DIAGNOSIS — H44.2A2 LEFT EYE AFFECTED BY DEGENERATIVE MYOPIA WITH CHOROIDAL NEOVASCULARIZATION: Primary | ICD-10-CM

## 2025-01-09 DIAGNOSIS — H35.373 EPIRETINAL MEMBRANE (ERM) OF BOTH EYES: ICD-10-CM

## 2025-01-09 PROCEDURE — 1126F AMNT PAIN NOTED NONE PRSNT: CPT | Mod: CPTII,S$GLB,, | Performed by: OPHTHALMOLOGY

## 2025-01-09 PROCEDURE — 1157F ADVNC CARE PLAN IN RCRD: CPT | Mod: CPTII,S$GLB,, | Performed by: OPHTHALMOLOGY

## 2025-01-09 PROCEDURE — 99999 PR PBB SHADOW E&M-EST. PATIENT-LVL II: CPT | Mod: PBBFAC,,, | Performed by: OPHTHALMOLOGY

## 2025-01-09 PROCEDURE — 1160F RVW MEDS BY RX/DR IN RCRD: CPT | Mod: CPTII,S$GLB,, | Performed by: OPHTHALMOLOGY

## 2025-01-09 PROCEDURE — G2211 COMPLEX E/M VISIT ADD ON: HCPCS | Mod: S$GLB,,, | Performed by: OPHTHALMOLOGY

## 2025-01-09 PROCEDURE — 92202 OPSCPY EXTND ON/MAC DRAW: CPT | Mod: 59,S$GLB,, | Performed by: OPHTHALMOLOGY

## 2025-01-09 PROCEDURE — 3288F FALL RISK ASSESSMENT DOCD: CPT | Mod: CPTII,S$GLB,, | Performed by: OPHTHALMOLOGY

## 2025-01-09 PROCEDURE — 92134 CPTRZ OPH DX IMG PST SGM RTA: CPT | Mod: S$GLB,,, | Performed by: OPHTHALMOLOGY

## 2025-01-09 PROCEDURE — 1101F PT FALLS ASSESS-DOCD LE1/YR: CPT | Mod: CPTII,S$GLB,, | Performed by: OPHTHALMOLOGY

## 2025-01-09 PROCEDURE — 1159F MED LIST DOCD IN RCRD: CPT | Mod: CPTII,S$GLB,, | Performed by: OPHTHALMOLOGY

## 2025-01-09 PROCEDURE — 99204 OFFICE O/P NEW MOD 45 MIN: CPT | Mod: S$GLB,,, | Performed by: OPHTHALMOLOGY

## 2025-01-09 RX ORDER — CYCLOSPORINE 0.5 MG/ML
1 EMULSION OPHTHALMIC 2 TIMES DAILY
Qty: 90 EACH | Refills: 6 | Status: SHIPPED | OUTPATIENT
Start: 2025-01-09 | End: 2025-03-10

## 2025-01-09 NOTE — PROGRESS NOTES
HPI    New patient mOCT/DFE OU/consultation for AMD with neovascularization OU   (Dr. David Urbina)  Pt transferring care due to insurance change. Pt brought medical records   to visit today.    CARIDAD: 11/22/2024 with Dr. David Urbina    Pt states that she was being followed by Dr. Urbina every 5 weeks for retina   injections OS only. Pt states that was initially diagnosed with AMD visit   11/22/2024, she was told that ARMD OU was stable. Pt states that she has   trouble with night driving. Pt states that she currently uses +2.50   magnifiers to help with near [facility]. Pt states that she was referred to a   corneal specialist for scarring of eyes but Dr. Urbina wanted AMD to be   stable prior to visit.    Pt denies any eye pain, flashes, or floaters in [facility] OU. Pt states that she   would like to discuss options for eye vitamins to help with VA.    Meds: Restasis BID OU (needs refills)    POHx:  1. Dry eye syndrome of bilateral lacrimal glands  2. Unspecified corneal scar and opacity  3. Exudative age-related macular degeneration, left eye, with active   choroidal neovascularization (HCC)  4. Puckering of macula, right eye  5. Vitreous degeneration, right eye  6. Regular astigmatism, bilateral  7. Presence of intraocular lens, bilateral  8. Degenerative myopia, bilateral  9. Hx of (both) LASIK and RK surgery OU    Last edited by Sanaz Hansen on 1/9/2025 11:01 AM.         A/P    ICD-10-CM ICD-9-CM   1. 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 Epiretinal membrane (ERM) of both eyes  H35.373 362.56   3. Lamellar macular hole, left  H35.342 362.54   4. Dry eye syndrome of both eyes  H04.123 375.15       1. Left eye affected by degenerative myopia with choroidal neovascularization  Transferring care from Dr Urbina for retina - Recent notes reviewed    Hx of CHIDI x3 (last was Nov/Dec 2024)    Exam notable for regressed CNVM, no heme today  Plan: discussed nature of findings  at length with patient and risk of recurrence of CNVM, monitor for now, recheck 2 mo, if stable can be q6m    Visit today is associated with current or anticipated ongoing medical care related to this patients single serious condition/complex condition (choroidal neovascularization)     2. Epiretinal membrane (ERM) of both eyes  3. Lamellar macular hole, left  Mild ERM OU with lamellar hole OS, no metamorphopsia  Plan: Observation for now, do not recommend PPV/MP at this time, will refer for updating Mrx (current ones are 2 yr old)    4. Dry eye syndrome of both eyes  Mod dry eye with Abmd component/scarring  Plan: continue restasis BID OU, incr Ats to QID     RTC 2 mo DFE/OCTm OU         I saw and examined the patient and reviewed in detail the findings documented. The final examination findings, image interpretations which have been independently interpreted, and plan as documented in the record represent my personal judgment and conclusions.    Jhon Prince MD  Vitreoretinal Surgery   [facility]

## 2025-02-27 ENCOUNTER — PATIENT MESSAGE (OUTPATIENT)
Dept: ADMINISTRATIVE | Facility: CLINIC | Age: 75
End: 2025-02-27
Payer: MEDICARE

## 2025-03-03 PROBLEM — H35.3221 EXUDATIVE AGE-RELATED MACULAR DEGENERATION, LEFT EYE, WITH ACTIVE CHOROIDAL NEOVASCULARIZATION: Status: ACTIVE | Noted: 2025-03-03

## 2025-03-03 RX ORDER — IBUPROFEN 800 MG/1
800 TABLET ORAL EVERY 6 HOURS PRN
COMMUNITY
Start: 2025-01-07

## 2025-03-03 NOTE — PROGRESS NOTES
The patient location is: [place]  The chief complaint leading to consultation is: Medicare Wellness Visit       Visit type: audiovisual     Face to Face time with patient: 25 total (13 audiovisual, 12.5 audio only due to virtual malfunction)  60 minutes of total time spent on the encounter, which includes face to face time and non-face to face time preparing to see the patient (eg, review of tests), Obtaining and/or reviewing separately obtained history, Documenting clinical information in the electronic or other health record, Independently interpreting results (not separately reported) and communicating results to the patient/family/caregiver, or Care coordination (not separately reported).            Each patient to whom he or she provides medical services by telemedicine is:  (1) informed of the relationship between the physician and patient and the respective role of any other health care provider with respect to management of the patient; and (2) notified that he or she may decline to receive medical services by telemedicine and may withdraw from such care at any time.      Belen Salazar presented for a  Medicare AWV and comprehensive Health Risk Assessment today. The following components were reviewed and updated:    Medical history  Family History  Social history  Allergies and Current Medications  Health Risk Assessment  Health Maintenance  Care Team         ** See Completed Assessments for Annual Wellness Visit within the encounter summary.**         The following assessments were completed:  Living Situation  CAGE  Depression Screening  Timed Get Up and Go - unable to complete on virtual visit   Whisper Test - bilat hearing aids   Cognitive Function Screening -patient declined to complete   Nutrition Screening  ADL Screening  PAQ Screening      Opioid documentation for eAWV      Patient does not have a current opioid prescription.            Review for Substance Use Disorders: Patient does not use  "substance      Current Medications[1]         Vitals:    03/05/25 1300   Weight: 63.5 kg (140 lb)   Height: 5' 5" (1.651 m)   PainSc: 0-No pain      Physical Exam  Nursing note reviewed.   Constitutional:       General: She is not in acute distress.     Appearance: Normal appearance. She is not ill-appearing.   HENT:      Head: Normocephalic and atraumatic.   Eyes:      General: No scleral icterus.        Right eye: No discharge.         Left eye: No discharge.      Extraocular Movements: Extraocular movements intact.      Conjunctiva/sclera: Conjunctivae normal.      Comments: +glasses   Pulmonary:      Effort: Pulmonary effort is normal. No respiratory distress.   Musculoskeletal:      Cervical back: Normal range of motion.   Neurological:      Mental Status: She is alert and oriented to person, place, and time.   Psychiatric:         Mood and Affect: Mood normal.         Behavior: Behavior normal.         Cognition and Memory: Cognition normal.               Diagnoses and health risks identified today and associated recommendations/orders:    1. Encounter for preventive health examination  - Chart reviewed. Problem list updated. Discussed current medical diagnosis, current medications, medical/surgical/family/social history; updated provider list; documented vital signs; identified any cognitive impairment; and updated risk factor list. Addressed any outstanding health maintenance. Provided patient with personalized health advice. Continue to follow up with PCP and any specialists.       2. Exudative age-related macular degeneration, left eye, with active choroidal neovascularization - per 8/13/2024 ophthal  Chronic; stable on current treatment plan; follow up with PCP  - follow up with ophthalmology     3. Thoracogenic scoliosis of thoracic region  Chronic; stable on current treatment plan; follow up with PCP  - ibuprofen PRN    4. Osteoarthritis of spine with radiculopathy, cervical region  Chronic; stable on " current treatment plan; follow up with PCP  - ibuprofen PRN    5. 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 Dry eye syndrome of both eyes  Chronic; stable on current treatment plan; follow up with PCP  - follow up with ophthalmology   - continue restasis eye drops     7. Overactive bladder  Chronic; stable on current treatment plan; follow up with PCP  - continue oxybutynin    8. Age-related osteoporosis without current pathological fracture  Chronic; stable on current treatment plan; follow up with PCP  - up to date on dexa  - continue weight bearing exercises; continue calcium supplements     9. Gastroesophageal reflux disease without esophagitis  Chronic; stable on current treatment plan; follow up with PCP  - continue PPI and avoid food triggers    10. CAROL (obstructive sleep apnea)  Chronic; stable on current treatment plan; follow up with PCP  - follow up with sleep medicine     11. BMI 23.0-23.9, adult  - Recommendation for healthy diet and increasing exercise as tolerated with goal of 150min/week       Provided Belen with a 5-10 year written screening schedule and personal prevention plan. Recommendations were developed using the USPSTF age appropriate recommendations. Education, counseling, and referrals were provided as needed. After Visit Summary printed and given to patient which includes a list of additional screenings\tests needed.    Follow up in about 1 year (around 3/5/2026) for your next medicare wellness visit.    Angélica Gallegos, JOSEP-C    Advance Care Planning     I offered to discuss advanced care planning, including how to pick a person who would make decisions for you if you were unable to make them for yourself, called a health care power of , and what kind of decisions you might make such as use of life sustaining treatments such as ventilators and  tube feeding when faced with a life limiting illness recorded on a living will that they will need to know. (How you want to be cared for as you near the end of your natural life)     X  Patient has advanced directives on file, which we reviewed, and they do not wish to make changes.         [1]   Current Outpatient Medications:     calcium carbonate (OS-SHONDA) 600 mg calcium (1,500 mg) Tab, Take 600 mg by mouth once daily., Disp: , Rfl:     ibuprofen (ADVIL,MOTRIN) 800 MG tablet, Take 800 mg by mouth every 6 (six) hours as needed., Disp: , Rfl:     oxybutynin (DITROPAN) 5 MG Tab, Take 1 tablet (5 mg total) by mouth once daily., Disp: 90 tablet, Rfl: 3    pantoprazole (PROTONIX) 40 MG tablet, Take 1 tablet (40 mg total) by mouth once daily., Disp: 90 tablet, Rfl: 3    RESTASIS 0.05 % ophthalmic emulsion, Place 1 drop into both eyes 2 (two) times daily., Disp: 90 each, Rfl: 6

## 2025-03-05 ENCOUNTER — OFFICE VISIT (OUTPATIENT)
Dept: FAMILY MEDICINE | Facility: CLINIC | Age: 75
End: 2025-03-05
Payer: MEDICARE

## 2025-03-05 VITALS — HEIGHT: 65 IN | BODY MASS INDEX: 23.32 KG/M2 | WEIGHT: 140 LBS

## 2025-03-05 DIAGNOSIS — H44.2A2 LEFT EYE AFFECTED BY DEGENERATIVE MYOPIA WITH CHOROIDAL NEOVASCULARIZATION: ICD-10-CM

## 2025-03-05 DIAGNOSIS — H35.3221 EXUDATIVE AGE-RELATED MACULAR DEGENERATION, LEFT EYE, WITH ACTIVE CHOROIDAL NEOVASCULARIZATION: ICD-10-CM

## 2025-03-05 DIAGNOSIS — G47.33 OSA (OBSTRUCTIVE SLEEP APNEA): ICD-10-CM

## 2025-03-05 DIAGNOSIS — Z00.00 ENCOUNTER FOR PREVENTIVE HEALTH EXAMINATION: Primary | ICD-10-CM

## 2025-03-05 DIAGNOSIS — H04.123 DRY EYE SYNDROME OF BOTH EYES: ICD-10-CM

## 2025-03-05 DIAGNOSIS — M81.0 AGE-RELATED OSTEOPOROSIS WITHOUT CURRENT PATHOLOGICAL FRACTURE: ICD-10-CM

## 2025-03-05 DIAGNOSIS — M41.34 THORACOGENIC SCOLIOSIS OF THORACIC REGION: ICD-10-CM

## 2025-03-05 DIAGNOSIS — M47.22 OSTEOARTHRITIS OF SPINE WITH RADICULOPATHY, CERVICAL REGION: ICD-10-CM

## 2025-03-05 DIAGNOSIS — K21.9 GASTROESOPHAGEAL REFLUX DISEASE WITHOUT ESOPHAGITIS: ICD-10-CM

## 2025-03-05 DIAGNOSIS — N32.81 OVERACTIVE BLADDER: ICD-10-CM

## 2025-03-05 RX ORDER — PSYLLIUM HUSK 0.4 G
600 CAPSULE ORAL DAILY
COMMUNITY

## 2025-03-05 NOTE — PATIENT INSTRUCTIONS
Guanakito Glover,     If you are due for any health screening(s) below please notify me so we can arrange them to be ordered and scheduled. Most healthy patients at your age complete them, but you are free to accept or refuse.     If you can't do it, I'll definitely understand. If you can, I'd certainly appreciate it!    All of your core healthy metrics are met.                   Counseling and Referral of Other Preventative  (Italic type indicates deductible and co-insurance are waived)    Patient Name: Belen Salazar  Today's Date: 3/5/2025    Health Maintenance         Date Due Completion Date    LDCT Lung Screen 06/05/2016 6/5/2015    COVID-19 Vaccine (4 - 2024-25 season) 03/07/2025 (Originally 9/1/2024) 7/26/2022    RSV Vaccine (Age 60+ and Pregnant patients) (1 - 1-dose 75+ series) 03/07/2025 (Originally 2/14/2025) ---    Influenza Vaccine (1) 06/30/2025 (Originally 9/1/2024) 10/17/2020    Override on 10/1/2019: Done    Colorectal Cancer Screening 12/05/2025 12/5/2024    DEXA Scan 08/17/2026 8/17/2023    Lipid Panel 12/31/2029 12/31/2024    TETANUS VACCINE 03/09/2030 3/9/2020          No orders of the defined types were placed in this encounter.    The following information is provided to all patients.  This information is to help you find resources for any of the problems found today that may be affecting your health:                  Living healthy guide: www.[facility].[place].gov      Understanding Diabetes: www.diabetes.org      Eating healthy: www.cdc.gov/healthyweight      CDC home safety checklist: www.cdc.gov/steadi/patient.html      Agency on Aging: www.goea.[place].gov      Alcoholics anonymous (AA): www.aa.org      Physical Activity: www.[place].nih.gov/sg7gmac      Tobacco use: www.quitwithusla.org

## 2025-03-11 ENCOUNTER — OFFICE VISIT (OUTPATIENT)
Dept: OPHTHALMOLOGY | Facility: CLINIC | Age: 75
End: 2025-03-11
Payer: MEDICARE

## 2025-03-11 ENCOUNTER — CLINICAL SUPPORT (OUTPATIENT)
Dept: OPHTHALMOLOGY | Facility: CLINIC | Age: 75
End: 2025-03-11
Payer: MEDICARE

## 2025-03-11 DIAGNOSIS — H35.373 EPIRETINAL MEMBRANE (ERM) OF BOTH EYES: ICD-10-CM

## 2025-03-11 DIAGNOSIS — H44.2A2 LEFT EYE AFFECTED BY DEGENERATIVE MYOPIA WITH CHOROIDAL NEOVASCULARIZATION: Primary | ICD-10-CM

## 2025-03-11 DIAGNOSIS — H04.123 DRY EYE SYNDROME OF BOTH EYES: ICD-10-CM

## 2025-03-11 DIAGNOSIS — H35.342 LAMELLAR MACULAR HOLE, LEFT: ICD-10-CM

## 2025-03-11 PROCEDURE — 3288F FALL RISK ASSESSMENT DOCD: CPT | Mod: CPTII,S$GLB,, | Performed by: OPHTHALMOLOGY

## 2025-03-11 PROCEDURE — 1159F MED LIST DOCD IN RCRD: CPT | Mod: CPTII,S$GLB,, | Performed by: OPHTHALMOLOGY

## 2025-03-11 PROCEDURE — 92202 OPSCPY EXTND ON/MAC DRAW: CPT | Mod: 59,S$GLB,, | Performed by: OPHTHALMOLOGY

## 2025-03-11 PROCEDURE — 92134 CPTRZ OPH DX IMG PST SGM RTA: CPT | Mod: S$GLB,,, | Performed by: OPHTHALMOLOGY

## 2025-03-11 PROCEDURE — G2211 COMPLEX E/M VISIT ADD ON: HCPCS | Mod: S$GLB,,, | Performed by: OPHTHALMOLOGY

## 2025-03-11 PROCEDURE — 1157F ADVNC CARE PLAN IN RCRD: CPT | Mod: CPTII,S$GLB,, | Performed by: OPHTHALMOLOGY

## 2025-03-11 PROCEDURE — 1101F PT FALLS ASSESS-DOCD LE1/YR: CPT | Mod: CPTII,S$GLB,, | Performed by: OPHTHALMOLOGY

## 2025-03-11 PROCEDURE — 1160F RVW MEDS BY RX/DR IN RCRD: CPT | Mod: CPTII,S$GLB,, | Performed by: OPHTHALMOLOGY

## 2025-03-11 PROCEDURE — 1126F AMNT PAIN NOTED NONE PRSNT: CPT | Mod: CPTII,S$GLB,, | Performed by: OPHTHALMOLOGY

## 2025-03-11 PROCEDURE — 99999 PR PBB SHADOW E&M-EST. PATIENT-LVL III: CPT | Mod: PBBFAC,,, | Performed by: OPHTHALMOLOGY

## 2025-03-11 PROCEDURE — 99214 OFFICE O/P EST MOD 30 MIN: CPT | Mod: S$GLB,,, | Performed by: OPHTHALMOLOGY

## 2025-03-11 RX ORDER — CYCLOSPORINE 0.5 MG/ML
1 EMULSION OPHTHALMIC 2 TIMES DAILY
Qty: 90 EACH | Refills: 6 | Status: SHIPPED | OUTPATIENT
Start: 2025-03-11 | End: 2025-03-11

## 2025-03-11 NOTE — PROGRESS NOTES
HPI    2m Oct/DFE    Pt states va is unchanged since last visit. Pt taking drops as advised but   wanted to note she will need refills of the generic due to insurance   coverage. No new symptoms today.    No flashes  No floaters  No pain  Gtts:  Restasis BID OU  At's QID OU  Last edited by Steffany Schwartz on 3/11/2025 11:51 AM.         A/P    ICD-10-CM ICD-9-CM   1. 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 Epiretinal membrane (ERM) of both eyes  H35.373 362.56   3. Lamellar macular hole, left  H35.342 362.54   4. Dry eye syndrome of both eyes  H04.123 375.15         1. 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 Epiretinal membrane (ERM) of both eyes  3. Lamellar macular hole, left  Mild ERM OU with lamellar hole OS, no metamorphopsia  Plan: Observation for now, do not recommend PPV/MP at this time, will refer for updating Mrx (current ones are 2 yr old)    4. Dry eye syndrome of both eyes  Mod dry eye with Abmd component/scarring  Plan: continue restasis BID OU, Ats QID , will refer to cornea specialist    RTC 6 mo DFE/OCTm OU   RTC Judy for ABMD check      I saw and examined the patient and reviewed in detail the findings documented. The final examination findings, image interpretations which have been independently interpreted, and plan as documented in the record represent my personal judgment and conclusions.    Jhon Prince MD  Vitreoretinal Surgery   [facility]

## 2025-03-21 ENCOUNTER — OFFICE VISIT (OUTPATIENT)
Dept: OPHTHALMOLOGY | Facility: CLINIC | Age: 75
End: 2025-03-21
Payer: MEDICARE

## 2025-03-21 ENCOUNTER — PATIENT MESSAGE (OUTPATIENT)
Dept: OPTOMETRY | Facility: CLINIC | Age: 75
End: 2025-03-21
Payer: MEDICARE

## 2025-03-21 DIAGNOSIS — H44.2A2 LEFT EYE AFFECTED BY DEGENERATIVE MYOPIA WITH CHOROIDAL NEOVASCULARIZATION: ICD-10-CM

## 2025-03-21 DIAGNOSIS — Z98.890 HX OF LASIK: ICD-10-CM

## 2025-03-21 DIAGNOSIS — Z98.890 HISTORY OF RADIAL KERATOTOMY: ICD-10-CM

## 2025-03-21 DIAGNOSIS — H25.11 NUCLEAR SCLEROTIC CATARACT OF RIGHT EYE: ICD-10-CM

## 2025-03-21 DIAGNOSIS — H25.12 NUCLEAR SCLEROTIC CATARACT OF LEFT EYE: Primary | ICD-10-CM

## 2025-03-21 DIAGNOSIS — H35.373 EPIRETINAL MEMBRANE (ERM) OF BOTH EYES: ICD-10-CM

## 2025-03-21 DIAGNOSIS — H35.342 LAMELLAR MACULAR HOLE, LEFT: ICD-10-CM

## 2025-03-21 PROCEDURE — 99999 PR PBB SHADOW E&M-EST. PATIENT-LVL I: CPT | Mod: PBBFAC,,, | Performed by: OPHTHALMOLOGY

## 2025-03-21 NOTE — PROGRESS NOTES
"HPI    Referred by Dr. Prince / prior pt of Dr. 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  Pt. States vision is blurry OU due to scar tissue.  Pt. States vision have been blurry for so long that she has not noticed if   vision have gotten worse.  Pt. Denies irritation, FB sensation, photophobia, pain or discomfort.  Last edited by Bess Kim MD on 3/21/2025 10:21 AM.            Assessment /Plan     For exam results, see Encounter Report.    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                  Pt did try scleral CL about 5 year  ago -- had difficulty getting them in and out. "Because eyes are so small"    Willing to try " RGPs again however, will refer to jordin for eval.    Today's visit is associated with current and anticipated ongoing medical care related to this patient's single serious/complex condition (cornea/myopic degen). Follow up is to be continued indefinitely to monitor the condition.

## 2025-03-22 ENCOUNTER — PATIENT MESSAGE (OUTPATIENT)
Dept: OPHTHALMOLOGY | Facility: CLINIC | Age: 75
End: 2025-03-22
Payer: MEDICARE

## 2025-03-28 ENCOUNTER — OFFICE VISIT (OUTPATIENT)
Dept: OPTOMETRY | Facility: CLINIC | Age: 75
End: 2025-03-28
Payer: COMMERCIAL

## 2025-03-28 DIAGNOSIS — H35.342 LAMELLAR MACULAR HOLE, LEFT: ICD-10-CM

## 2025-03-28 DIAGNOSIS — H35.373 EPIRETINAL MEMBRANE (ERM) OF BOTH EYES: ICD-10-CM

## 2025-03-28 DIAGNOSIS — H04.123 DRY EYE SYNDROME OF BOTH EYES: ICD-10-CM

## 2025-03-28 DIAGNOSIS — H52.13 MYOPIA, BILATERAL: Primary | ICD-10-CM

## 2025-03-28 DIAGNOSIS — H44.2A2 LEFT EYE AFFECTED BY DEGENERATIVE MYOPIA WITH CHOROIDAL NEOVASCULARIZATION: ICD-10-CM

## 2025-03-28 DIAGNOSIS — H35.3221 EXUDATIVE AGE-RELATED MACULAR DEGENERATION, LEFT EYE, WITH ACTIVE CHOROIDAL NEOVASCULARIZATION: ICD-10-CM

## 2025-03-28 DIAGNOSIS — H52.203 ASTIGMATISM OF BOTH EYES, UNSPECIFIED TYPE: ICD-10-CM

## 2025-03-28 PROCEDURE — 99999 PR PBB SHADOW E&M-EST. PATIENT-LVL II: CPT | Mod: PBBFAC,,, | Performed by: OPTOMETRIST

## 2025-03-28 NOTE — PROGRESS NOTES
HPI    DLS: 3/21/2025 - Dr. Kim   Mrx    Pt states that she currently wears pal and states that she has difficulty   with near and distance vision. Pt states that her eyes still feel dry even   though she is consistent with restasis.     GTTS:   Restasis BID  AT's PRN   Last edited by Alma Robert MA on 3/28/2025  2:58 PM.            Assessment /Plan     For exam results, see Encounter Report.    Myopia, bilateral  Astigmatism of both eyes, unspecified type   Rx specs   Pt has appt with Dr. Sutherland for CL scleral fit      Hx of LASIK  History of radial keratotomy     H/o RK OU first, followed by LASIK about 25 years ago.     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               Dry eye syndrome of both eyes   Failed restasis and art tears  -  Start Eyesuvis QID OU x 2 weeks then stop and start VEVYE: cycloSPORINE 0.05 % Drop; Place 1 drop into both eyes every 12 (twelve) hours.  Dispense: 5.5 mL; Refill: 6   Continue with thera tears PRN   Consider plugs next

## 2025-04-07 ENCOUNTER — PATIENT MESSAGE (OUTPATIENT)
Dept: OPTOMETRY | Facility: CLINIC | Age: 75
End: 2025-04-07
Payer: MEDICARE

## 2025-04-15 ENCOUNTER — PATIENT MESSAGE (OUTPATIENT)
Dept: OPTOMETRY | Facility: CLINIC | Age: 75
End: 2025-04-15
Payer: MEDICARE

## 2025-04-15 RX ORDER — CYCLOSPORINE OPHTHALMIC SOLUTION 1 MG/ML
1 SOLUTION/ DROPS OPHTHALMIC 2 TIMES DAILY
Qty: 2 ML | Refills: 11 | Status: SHIPPED | OUTPATIENT
Start: 2025-04-15 | End: 2026-04-15

## 2025-05-07 ENCOUNTER — PATIENT MESSAGE (OUTPATIENT)
Dept: OPHTHALMOLOGY | Facility: CLINIC | Age: 75
End: 2025-05-07
Payer: MEDICARE

## 2025-06-06 ENCOUNTER — OFFICE VISIT (OUTPATIENT)
Dept: OPTOMETRY | Facility: CLINIC | Age: 75
End: 2025-06-06
Payer: MEDICARE

## 2025-06-06 DIAGNOSIS — H52.213 POSTOPERATIVE IRREGULAR ASTIGMATISM OF BOTH EYES: ICD-10-CM

## 2025-06-06 DIAGNOSIS — T81.89XA POSTOPERATIVE IRREGULAR ASTIGMATISM OF BOTH EYES: ICD-10-CM

## 2025-06-06 DIAGNOSIS — H18.523 ANTERIOR BASEMENT MEMBRANE DYSTROPHY OF BOTH EYES: ICD-10-CM

## 2025-06-06 DIAGNOSIS — Z98.890 HISTORY OF RADIAL KERATOTOMY: Primary | ICD-10-CM

## 2025-06-06 PROCEDURE — 99999 PR PBB SHADOW E&M-EST. PATIENT-LVL II: CPT | Mod: PBBFAC,,, | Performed by: OPTOMETRIST

## 2025-06-06 RX ORDER — MULTIVITAMIN WITH IRON
TABLET ORAL DAILY
COMMUNITY

## 2025-06-15 ENCOUNTER — PATIENT MESSAGE (OUTPATIENT)
Dept: OPTOMETRY | Facility: CLINIC | Age: 75
End: 2025-06-15
Payer: MEDICARE

## 2025-06-15 ENCOUNTER — PATIENT MESSAGE (OUTPATIENT)
Dept: OPHTHALMOLOGY | Facility: CLINIC | Age: 75
End: 2025-06-15
Payer: MEDICARE

## 2025-06-16 RX ORDER — CYCLOSPORINE OPHTHALMIC SOLUTION 1 MG/ML
1 SOLUTION/ DROPS OPHTHALMIC 2 TIMES DAILY
Qty: 2 ML | Refills: 6 | Status: SHIPPED | OUTPATIENT
Start: 2025-06-16 | End: 2025-06-19 | Stop reason: SDUPTHER

## 2025-06-18 ENCOUNTER — PATIENT MESSAGE (OUTPATIENT)
Dept: OPTOMETRY | Facility: CLINIC | Age: 75
End: 2025-06-18
Payer: MEDICARE

## 2025-06-19 RX ORDER — CYCLOSPORINE OPHTHALMIC SOLUTION 1 MG/ML
1 SOLUTION/ DROPS OPHTHALMIC 2 TIMES DAILY
Qty: 2 ML | Refills: 6 | Status: SHIPPED | OUTPATIENT
Start: 2025-06-19 | End: 2026-06-19

## 2025-06-25 ENCOUNTER — PATIENT MESSAGE (OUTPATIENT)
Dept: OPTOMETRY | Facility: CLINIC | Age: 75
End: 2025-06-25
Payer: MEDICARE

## 2025-06-26 ENCOUNTER — TELEPHONE (OUTPATIENT)
Dept: OPTOMETRY | Facility: CLINIC | Age: 75
End: 2025-06-26
Payer: MEDICARE

## 2025-06-26 DIAGNOSIS — H04.123 DRY EYE SYNDROME OF BOTH EYES: Primary | ICD-10-CM

## 2025-06-26 RX ORDER — CYCLOSPORINE 0.5 MG/ML
1 EMULSION OPHTHALMIC 2 TIMES DAILY
Qty: 60 EACH | Refills: 11 | Status: SHIPPED | OUTPATIENT
Start: 2025-06-26 | End: 2026-06-26

## 2025-06-26 NOTE — TELEPHONE ENCOUNTER
Per Dr. Alicea and Mrs. Salazar- will continue Cyclosporin .05% and not use Vevye because of cost(No PA and insurance doesn't cover).

## 2025-07-11 ENCOUNTER — OFFICE VISIT (OUTPATIENT)
Dept: OPTOMETRY | Facility: CLINIC | Age: 75
End: 2025-07-11
Payer: MEDICARE

## 2025-07-11 DIAGNOSIS — H52.213 POSTOPERATIVE IRREGULAR ASTIGMATISM OF BOTH EYES: Primary | ICD-10-CM

## 2025-07-11 DIAGNOSIS — T81.89XA POSTOPERATIVE IRREGULAR ASTIGMATISM OF BOTH EYES: Primary | ICD-10-CM

## 2025-07-11 PROCEDURE — 99499 UNLISTED E&M SERVICE: CPT | Mod: S$GLB,,, | Performed by: OPTOMETRIST

## 2025-07-14 ENCOUNTER — PATIENT MESSAGE (OUTPATIENT)
Dept: OPTOMETRY | Facility: CLINIC | Age: 75
End: 2025-07-14
Payer: MEDICARE

## 2025-07-14 NOTE — PROGRESS NOTES
HPI    Train and dispense with Kecia   Improved VA but still hard to see smaller letters    Last edited by Will Sutherland, OD on 7/11/2025  1:34 PM.            Assessment /Plan     For exam results, see Encounter Report.    Postoperative irregular astigmatism of both eyes  -Ok to dispense, DW, WT 5 hrs +3      RTC 3-4 wks

## 2025-07-15 ENCOUNTER — OFFICE VISIT (OUTPATIENT)
Dept: OPTOMETRY | Facility: CLINIC | Age: 75
End: 2025-07-15
Payer: MEDICARE

## 2025-07-15 DIAGNOSIS — H57.89 CONTACT LENS STUCK: Primary | ICD-10-CM

## 2025-07-15 PROCEDURE — 99999 PR PBB SHADOW E&M-EST. PATIENT-LVL II: CPT | Mod: PBBFAC,,, | Performed by: OPTOMETRIST

## 2025-07-15 PROCEDURE — 99499 UNLISTED E&M SERVICE: CPT | Mod: S$GLB,,, | Performed by: OPTOMETRIST

## 2025-07-15 NOTE — PROGRESS NOTES
HPI    Pt presents today for urgent CL   Pt reports OD inserted lens day before yesterday   Pt reports OS has been in since yesterday   Pt reports had trouble with removal of lens     CL WEAR   Disinfection & Solution: tonny  GTTS: Theratears/ refresh/ IVCLARENCE   Last edited by Julia Casitllo on 7/15/2025  9:36 AM.            Assessment /Plan     For exam results, see Encounter Report.    Contact lens stuck  -Easily removed, not stuck, good fit  -needs retrain prior starting wear again      RTC ASAP train

## 2025-07-18 ENCOUNTER — OFFICE VISIT (OUTPATIENT)
Dept: OPTOMETRY | Facility: CLINIC | Age: 75
End: 2025-07-18
Payer: MEDICARE

## 2025-07-18 DIAGNOSIS — H52.213 POSTOPERATIVE IRREGULAR ASTIGMATISM OF BOTH EYES: Primary | ICD-10-CM

## 2025-07-18 DIAGNOSIS — T81.89XA POSTOPERATIVE IRREGULAR ASTIGMATISM OF BOTH EYES: Primary | ICD-10-CM

## 2025-07-18 NOTE — PROGRESS NOTES
HPI    Re-Train  Crack Left eye     Last edited by Will Sutherland, OD on 7/18/2025 10:45 AM.            Assessment /Plan     For exam results, see Encounter Report.    Postoperative irregular astigmatism of both eyes  Contact Lens Final Rx       Final Contact Lens Rx         Brand Base Curve Diameter Sphere Cylinder Lens Addl. Specs    Right AVT Natural Cornea 8.0 10.8 -5.00 Sphere Optimum Extra Hydra-PEG    Left AVT Natural Cornea 8.0 10.8 -4.50 Sphere Optimum Extra Hydra-PEG   Warranty exchange and ok for Contact lens room to dispense with re-train                     RTC ok to dispense w/o Dr Sutherland 1 wk F/U

## 2025-07-21 ENCOUNTER — PATIENT MESSAGE (OUTPATIENT)
Dept: ADMINISTRATIVE | Facility: HOSPITAL | Age: 75
End: 2025-07-21
Payer: MEDICARE

## 2025-07-22 ENCOUNTER — PATIENT OUTREACH (OUTPATIENT)
Dept: ADMINISTRATIVE | Facility: HOSPITAL | Age: 75
End: 2025-07-22
Payer: MEDICARE

## 2025-07-22 DIAGNOSIS — Z78.0 POSTMENOPAUSAL: Primary | ICD-10-CM

## 2025-07-24 ENCOUNTER — PATIENT MESSAGE (OUTPATIENT)
Dept: OPTOMETRY | Facility: CLINIC | Age: 75
End: 2025-07-24
Payer: MEDICARE

## 2025-07-29 ENCOUNTER — PATIENT MESSAGE (OUTPATIENT)
Dept: OPTOMETRY | Facility: CLINIC | Age: 75
End: 2025-07-29
Payer: MEDICARE

## 2025-07-29 ENCOUNTER — OFFICE VISIT (OUTPATIENT)
Dept: URGENT CARE | Facility: CLINIC | Age: 75
End: 2025-07-29
Payer: MEDICARE

## 2025-07-29 VITALS
HEIGHT: 65 IN | OXYGEN SATURATION: 98 % | SYSTOLIC BLOOD PRESSURE: 134 MMHG | HEART RATE: 65 BPM | RESPIRATION RATE: 18 BRPM | DIASTOLIC BLOOD PRESSURE: 80 MMHG | TEMPERATURE: 98 F | BODY MASS INDEX: 23.14 KG/M2 | WEIGHT: 138.88 LBS

## 2025-07-29 DIAGNOSIS — H18.822 CORNEAL ABRASION OF LEFT EYE DUE TO CONTACT LENS: Primary | ICD-10-CM

## 2025-07-29 DIAGNOSIS — H57.89 CONTACT LENS STUCK: ICD-10-CM

## 2025-07-29 PROCEDURE — 99499 UNLISTED E&M SERVICE: CPT | Mod: S$GLB,,, | Performed by: PHYSICIAN ASSISTANT

## 2025-07-29 PROCEDURE — 65205 REMOVE FOREIGN BODY FROM EYE: CPT | Mod: LT,S$GLB,, | Performed by: PHYSICIAN ASSISTANT

## 2025-07-29 RX ORDER — ERYTHROMYCIN 5 MG/G
OINTMENT OPHTHALMIC NIGHTLY
Qty: 1 G | Refills: 0 | Status: SHIPPED | OUTPATIENT
Start: 2025-07-29 | End: 2025-08-05

## 2025-07-29 RX ORDER — MOXIFLOXACIN 5 MG/ML
1 SOLUTION/ DROPS OPHTHALMIC 3 TIMES DAILY
Qty: 3 ML | Refills: 0 | Status: SHIPPED | OUTPATIENT
Start: 2025-07-29 | End: 2025-08-05

## 2025-07-29 NOTE — PATIENT INSTRUCTIONS

## 2025-07-29 NOTE — PROGRESS NOTES
"Subjective:      Patient ID: Belen Salazar is a 75 y.o. female.    Vitals:  height is 5' 5" (1.651 m) and weight is 63 kg (138 lb 14.2 oz). Her oral temperature is 97.7 °F (36.5 °C). Her blood pressure is 134/80 and her pulse is 65. Her respiration is 18 and oxygen saturation is 98%.     Chief Complaint: Foreign Body in Eye    Pt c/o of contact lens that got stuck in her eye last night. States she was up all night due to her eye burning.    Patient provider note starts here:  Patient presents with complaints of having a hard contact lens stuck in her left eye since yesterday evening when she was unable to remove it with the "plunger" device that she was provided with. These contact lenses are fairly new. States that she tried numerous times without success and now her eye is irritated and draining. Woke up this morning to upper and lower eyelid swelling and crusting drainage from the eye. Has mild photophobia and irritation. Her ophthalmologist is out of town this week.       Constitution: Negative for fever.   Neck: Negative for neck pain.   Cardiovascular:  Negative for chest pain, palpitations and sob on exertion.   Eyes:  Positive for foreign body in eye (contact lens), eye discharge, eye redness, photophobia and eyelid swelling.   Respiratory:  Negative for chest tightness and wheezing.    Gastrointestinal:  Negative for abdominal pain, vomiting and diarrhea.   Skin:  Negative for color change and wound.   Neurological:  Negative for numbness and tingling.      Objective:     Physical Exam   Constitutional: She is oriented to person, place, and time. She appears well-developed.  Non-toxic appearance. She does not appear ill. No distress.   HENT:   Head: Normocephalic and atraumatic.   Ears:   Right Ear: External ear normal.   Left Ear: External ear normal.   Nose: Nose normal.   Mouth/Throat: Oropharynx is clear and moist.   Eyes: EOM and lids are normal. Pupils are equal, round, and reactive to light. " Left eye exhibits discharge. Extraocular movement intact      Comments: There is a mucoid discharge from the left eye. The upper and lower eyelids are edematous. There is injection to the left conjunctiva.   One drop of proparacaine was placed into the left eye followed by one drop of fluorescein. Under Wood's lamp examination, the contact lens was visualized and removed by myself with cotton tipped swabs using one attempt. A large corneal abrasion is noted from the 4:00-6:00 position of the cornea. EOMI and painless. PERRL.    Neck: Trachea normal and phonation normal. Neck supple.   Musculoskeletal: Normal range of motion.         General: Normal range of motion.   Neurological: She is alert and oriented to person, place, and time.   Skin: Skin is warm, dry and intact.   Psychiatric: Her speech is normal and behavior is normal. Judgment and thought content normal.   Nursing note and vitals reviewed.      Assessment:     1. Corneal abrasion of left eye due to contact lens    2. Contact lens stuck      Foreign body removal    Date/Time: 7/29/2025 8:15 AM    Performed by: Melani Whitt PA-C  Authorized by: Melani Whitt PA-C  Consent Done: Yes  Consent: Verbal consent obtained  Risks and benefits: risks, benefits and alternatives were discussed  Consent given by: patient  Body area: eye  Location details: left conjunctiva  Anesthesia: local infiltration    Anesthesia:  Local Anesthetic: proparacaine drops    Patient sedated: no  Patient restrained: no  Patient cooperative: yes  Localization method: visualized and magnification (Wood's lamp)  Removal mechanism: moist cotton swab  Eye examined with fluorescein.  Fluorescein uptake.  Corneal abrasion size: large  Corneal abrasion location: inferior and lateral  No residual rust ring present.  1 objects recovered.  Objects recovered: Hard contact lens  Post-procedure assessment: foreign body removed  Patient tolerance: Patient tolerated the procedure well  with no immediate complications        Plan:       Corneal abrasion of left eye due to contact lens  -     moxifloxacin (VIGAMOX) 0.5 % ophthalmic solution; Place 1 drop into the left eye 3 (three) times daily. for 7 days  Dispense: 3 mL; Refill: 0  -     erythromycin (ROMYCIN) ophthalmic ointment; Place into the left eye every evening. for 7 days  Dispense: 1 g; Refill: 0    Contact lens stuck  -     Foreign body removal    Other orders  -     Foreign body removal          Medical Decision Making:   History:   Old Medical Records: I decided to obtain old medical records.  Old Records Summarized: records from clinic visits.  Urgent Care Management:  A. Problem List:   -Acute: Corneal abrasion of left eye due to contact lens, contact lens stuck    -Chronic: postoperative irregular astigmatism of both eyes, overactive bladder, GERD, obstructive sleep apnea, macular degeneration   B. Differential diagnosis: Corneal abrasion, retained foreign body, periorbital or orbital cellulitis, keratitis, iritis, subconjunctival hemorrhage, conjunctivitis, hordeolum/chalazion, glaucoma, retinal detachment  C. Diagnostic Testing Ordered: None  D. Diagnostic Testing Considered: None  E. Independent Historians: None  F. Urgent Care Midlevel Independent Results Interpretation:   G. Radiology:  H. Review of Previous Medical Records:  I. Home Medications Reviewed  J. Social Determinants of Health considered  K. Medical Decision Making and Disposition: Patient presents with complaints of having a hard contact lens stuck in the left eye. On exam, she is afebrile and nontoxic appearing. The contact lens was visualized and removed by me without complications, however, a large corneal abrasion is noted due to patient's unsuccessful attempts at removal of the contact lens last night. Advised close follow-up with her optometrist and ED precautions discussed. She verbalized understanding and agreed with plan.          Patient Instructions    Thank you for choosing [facility]!     Our goal in the Urgent Care is to always provide outstanding medical care. You may receive a survey by mail or e-mail in the next week regarding your experience today. We would greatly appreciate you completing and returning the survey. Your feedback provides us with a way to recognize our staff who provide very good care, and it helps us learn how to improve when your experience was below our aspiration of excellence.       We appreciate you trusting us with your medical care. We hope you feel better soon. We will be happy to take care of you for all of your future medical needs.    You must understand that you've received an Urgent Care treatment only and that you may be released before all your medical problems are known or treated. You, the patient, will arrange for follow up care as instructed.      Follow up with your PCP or specialty clinic as instructed in the next 2-3 days if not improved or as needed. You can call [phone] to schedule an appointment with appropriate provider.      If you condition worsens, we recommend that you receive another evaluation at the emergency room immediately or contact your primary medical clinic's after hours call service to discuss your concerns.      Please return here or go to the Emergency Department for any concerns or worsening condition.

## 2025-07-29 NOTE — PROCEDURES
Foreign body removal    Date/Time: 7/29/2025 8:15 AM    Performed by: Melani Whitt PA-C  Authorized by: Melani Whitt PA-C  Consent Done: Yes  Consent: Verbal consent obtained  Risks and benefits: risks, benefits and alternatives were discussed  Consent given by: patient  Body area: eye  Location details: left conjunctiva  Anesthesia: local infiltration    Anesthesia:  Local Anesthetic: proparacaine drops    Patient sedated: no  Patient restrained: no  Patient cooperative: yes  Localization method: visualized and magnification (Wood's lamp)  Removal mechanism: moist cotton swab  Eye examined with fluorescein.  Fluorescein uptake.  Corneal abrasion size: large  Corneal abrasion location: inferior and lateral  No residual rust ring present.  1 objects recovered.  Objects recovered: Hard contact lens  Post-procedure assessment: foreign body removed  Patient tolerance: Patient tolerated the procedure well with no immediate complications

## 2025-07-30 ENCOUNTER — PATIENT MESSAGE (OUTPATIENT)
Dept: OPTOMETRY | Facility: CLINIC | Age: 75
End: 2025-07-30
Payer: MEDICARE

## 2025-07-30 ENCOUNTER — TELEPHONE (OUTPATIENT)
Dept: OPTOMETRY | Facility: CLINIC | Age: 75
End: 2025-07-30
Payer: MEDICARE

## 2025-08-05 ENCOUNTER — PATIENT MESSAGE (OUTPATIENT)
Dept: PRIMARY CARE CLINIC | Facility: CLINIC | Age: 75
End: 2025-08-05
Payer: MEDICARE

## 2025-08-08 ENCOUNTER — OFFICE VISIT (OUTPATIENT)
Dept: OPTOMETRY | Facility: CLINIC | Age: 75
End: 2025-08-08
Payer: MEDICARE

## 2025-08-08 DIAGNOSIS — H52.213 POSTOPERATIVE IRREGULAR ASTIGMATISM OF BOTH EYES: Primary | ICD-10-CM

## 2025-08-08 DIAGNOSIS — T81.89XA POSTOPERATIVE IRREGULAR ASTIGMATISM OF BOTH EYES: Primary | ICD-10-CM

## 2025-08-08 NOTE — PROGRESS NOTES
HPI    Pt presents today for cl irritation   Pt reports cl got stuck in eye last week and went to urgent care for   removal   Pt was given MOXI   Pt reports good compliance and completed treatment     Pt reports lens no longer feels lens is moving around   Pt reached out to [facility] 07/29 stating lens is still slipping     Pt reports using readers c contacts to see distance       D/C CL wear     CL WEAR   Disinfection & Solution: [place]  GTTS: Theratears/ refresh/ IVIZIA       Last edited by Julia Castillo on 8/8/2025  1:11 PM.            Assessment /Plan     For exam results, see Encounter Report.    Postoperative irregular astigmatism of both eyes  -No abrasion noted today, most likely ABMD previously noted  Contact Lens Final Rx       Final Contact Lens Rx         Brand Base Curve Diameter Sphere Cylinder Lens Addl. Specs    Right           Left AVT Natural Cornea 8.0 10.8 -3.25 Sphere Optimum Extra Hydra-PEG   Warranty Exchange                     RTC dispense

## 2025-08-17 ENCOUNTER — PATIENT MESSAGE (OUTPATIENT)
Dept: OPTOMETRY | Facility: CLINIC | Age: 75
End: 2025-08-17
Payer: MEDICARE

## 2025-08-18 ENCOUNTER — OFFICE VISIT (OUTPATIENT)
Dept: OPTOMETRY | Facility: CLINIC | Age: 75
End: 2025-08-18
Payer: MEDICARE

## 2025-08-18 DIAGNOSIS — H57.89 CONTACT LENS STUCK: ICD-10-CM

## 2025-08-18 DIAGNOSIS — T81.89XA POSTOPERATIVE IRREGULAR ASTIGMATISM OF BOTH EYES: Primary | ICD-10-CM

## 2025-08-18 DIAGNOSIS — H52.213 POSTOPERATIVE IRREGULAR ASTIGMATISM OF BOTH EYES: Primary | ICD-10-CM

## 2025-08-18 PROCEDURE — 99499 UNLISTED E&M SERVICE: CPT | Mod: S$GLB,,, | Performed by: OPTOMETRIST

## 2025-08-20 ENCOUNTER — PATIENT MESSAGE (OUTPATIENT)
Dept: OPTOMETRY | Facility: CLINIC | Age: 75
End: 2025-08-20
Payer: MEDICARE

## 2025-08-27 ENCOUNTER — PATIENT MESSAGE (OUTPATIENT)
Dept: OPTOMETRY | Facility: CLINIC | Age: 75
End: 2025-08-27
Payer: MEDICARE

## 2025-08-28 ENCOUNTER — OFFICE VISIT (OUTPATIENT)
Facility: CLINIC | Age: 75
End: 2025-08-28
Payer: MEDICARE

## 2025-08-28 VITALS
WEIGHT: 137.19 LBS | SYSTOLIC BLOOD PRESSURE: 137 MMHG | HEIGHT: 65 IN | HEART RATE: 74 BPM | DIASTOLIC BLOOD PRESSURE: 78 MMHG | BODY MASS INDEX: 22.86 KG/M2

## 2025-08-28 DIAGNOSIS — G47.33 OSA (OBSTRUCTIVE SLEEP APNEA): Primary | ICD-10-CM

## 2025-08-28 PROCEDURE — 3288F FALL RISK ASSESSMENT DOCD: CPT | Mod: CPTII,S$GLB,, | Performed by: NURSE PRACTITIONER

## 2025-08-28 PROCEDURE — 1126F AMNT PAIN NOTED NONE PRSNT: CPT | Mod: CPTII,S$GLB,, | Performed by: NURSE PRACTITIONER

## 2025-08-28 PROCEDURE — 3075F SYST BP GE 130 - 139MM HG: CPT | Mod: CPTII,S$GLB,, | Performed by: NURSE PRACTITIONER

## 2025-08-28 PROCEDURE — 3078F DIAST BP <80 MM HG: CPT | Mod: CPTII,S$GLB,, | Performed by: NURSE PRACTITIONER

## 2025-08-28 PROCEDURE — 99999 PR PBB SHADOW E&M-EST. PATIENT-LVL II: CPT | Mod: PBBFAC,,, | Performed by: NURSE PRACTITIONER

## 2025-08-28 PROCEDURE — 1101F PT FALLS ASSESS-DOCD LE1/YR: CPT | Mod: CPTII,S$GLB,, | Performed by: NURSE PRACTITIONER

## 2025-08-28 PROCEDURE — 1157F ADVNC CARE PLAN IN RCRD: CPT | Mod: CPTII,S$GLB,, | Performed by: NURSE PRACTITIONER

## 2025-08-28 PROCEDURE — 99214 OFFICE O/P EST MOD 30 MIN: CPT | Mod: S$GLB,,, | Performed by: NURSE PRACTITIONER

## 2025-08-29 ENCOUNTER — OFFICE VISIT (OUTPATIENT)
Dept: OPTOMETRY | Facility: CLINIC | Age: 75
End: 2025-08-29
Payer: MEDICARE

## 2025-08-29 DIAGNOSIS — H52.213 POSTOPERATIVE IRREGULAR ASTIGMATISM OF BOTH EYES: Primary | ICD-10-CM

## 2025-08-29 DIAGNOSIS — T81.89XA POSTOPERATIVE IRREGULAR ASTIGMATISM OF BOTH EYES: Primary | ICD-10-CM

## 2025-08-29 PROCEDURE — 99999 PR PBB SHADOW E&M-EST. PATIENT-LVL III: CPT | Mod: PBBFAC,,, | Performed by: OPTOMETRIST

## 2025-08-29 RX ORDER — ERGOCALCIFEROL 1.25 MG/1
50000 CAPSULE ORAL
COMMUNITY